# Patient Record
Sex: FEMALE | Race: OTHER | HISPANIC OR LATINO | URBAN - METROPOLITAN AREA
[De-identification: names, ages, dates, MRNs, and addresses within clinical notes are randomized per-mention and may not be internally consistent; named-entity substitution may affect disease eponyms.]

---

## 2018-05-02 ENCOUNTER — INPATIENT (INPATIENT)
Facility: HOSPITAL | Age: 63
LOS: 2 days | Discharge: HOME | End: 2018-05-05
Attending: SURGERY | Admitting: SURGERY
Payer: MEDICAID

## 2018-05-02 VITALS
RESPIRATION RATE: 20 BRPM | HEART RATE: 73 BPM | TEMPERATURE: 98 F | DIASTOLIC BLOOD PRESSURE: 75 MMHG | SYSTOLIC BLOOD PRESSURE: 140 MMHG | OXYGEN SATURATION: 100 %

## 2018-05-02 DIAGNOSIS — Z98.890 OTHER SPECIFIED POSTPROCEDURAL STATES: Chronic | ICD-10-CM

## 2018-05-02 DIAGNOSIS — R10.9 UNSPECIFIED ABDOMINAL PAIN: ICD-10-CM

## 2018-05-02 LAB
ALBUMIN SERPL ELPH-MCNC: 4.5 G/DL — SIGNIFICANT CHANGE UP (ref 3.3–5)
ALP SERPL-CCNC: 66 U/L — SIGNIFICANT CHANGE UP (ref 40–120)
ALT FLD-CCNC: 22 U/L — SIGNIFICANT CHANGE UP (ref 4–33)
APPEARANCE UR: CLEAR — SIGNIFICANT CHANGE UP
AST SERPL-CCNC: 21 U/L — SIGNIFICANT CHANGE UP (ref 4–32)
BASOPHILS # BLD AUTO: 0.04 K/UL — SIGNIFICANT CHANGE UP (ref 0–0.2)
BASOPHILS NFR BLD AUTO: 0.6 % — SIGNIFICANT CHANGE UP (ref 0–2)
BILIRUB SERPL-MCNC: 0.2 MG/DL — SIGNIFICANT CHANGE UP (ref 0.2–1.2)
BILIRUB UR-MCNC: NEGATIVE — SIGNIFICANT CHANGE UP
BLD GP AB SCN SERPL QL: NEGATIVE — SIGNIFICANT CHANGE UP
BLOOD UR QL VISUAL: NEGATIVE — SIGNIFICANT CHANGE UP
BUN SERPL-MCNC: 18 MG/DL — SIGNIFICANT CHANGE UP (ref 7–23)
CALCIUM SERPL-MCNC: 9 MG/DL — SIGNIFICANT CHANGE UP (ref 8.4–10.5)
CHLORIDE SERPL-SCNC: 103 MMOL/L — SIGNIFICANT CHANGE UP (ref 98–107)
CO2 SERPL-SCNC: 26 MMOL/L — SIGNIFICANT CHANGE UP (ref 22–31)
COLOR SPEC: YELLOW — SIGNIFICANT CHANGE UP
CREAT SERPL-MCNC: 0.88 MG/DL — SIGNIFICANT CHANGE UP (ref 0.5–1.3)
EOSINOPHIL # BLD AUTO: 0.23 K/UL — SIGNIFICANT CHANGE UP (ref 0–0.5)
EOSINOPHIL NFR BLD AUTO: 3.6 % — SIGNIFICANT CHANGE UP (ref 0–6)
GLUCOSE SERPL-MCNC: 93 MG/DL — SIGNIFICANT CHANGE UP (ref 70–99)
GLUCOSE UR-MCNC: NEGATIVE — SIGNIFICANT CHANGE UP
HCT VFR BLD CALC: 44.2 % — SIGNIFICANT CHANGE UP (ref 34.5–45)
HGB BLD-MCNC: 14.2 G/DL — SIGNIFICANT CHANGE UP (ref 11.5–15.5)
IMM GRANULOCYTES # BLD AUTO: 0.01 # — SIGNIFICANT CHANGE UP
IMM GRANULOCYTES NFR BLD AUTO: 0.2 % — SIGNIFICANT CHANGE UP (ref 0–1.5)
INR BLD: 1.02 — SIGNIFICANT CHANGE UP (ref 0.88–1.17)
KETONES UR-MCNC: NEGATIVE — SIGNIFICANT CHANGE UP
LEUKOCYTE ESTERASE UR-ACNC: NEGATIVE — SIGNIFICANT CHANGE UP
LIDOCAIN IGE QN: 35.1 U/L — SIGNIFICANT CHANGE UP (ref 7–60)
LYMPHOCYTES # BLD AUTO: 2.87 K/UL — SIGNIFICANT CHANGE UP (ref 1–3.3)
LYMPHOCYTES # BLD AUTO: 45 % — HIGH (ref 13–44)
MCHC RBC-ENTMCNC: 28.8 PG — SIGNIFICANT CHANGE UP (ref 27–34)
MCHC RBC-ENTMCNC: 32.1 % — SIGNIFICANT CHANGE UP (ref 32–36)
MCV RBC AUTO: 89.7 FL — SIGNIFICANT CHANGE UP (ref 80–100)
MONOCYTES # BLD AUTO: 0.48 K/UL — SIGNIFICANT CHANGE UP (ref 0–0.9)
MONOCYTES NFR BLD AUTO: 7.5 % — SIGNIFICANT CHANGE UP (ref 2–14)
MUCOUS THREADS # UR AUTO: SIGNIFICANT CHANGE UP
NEUTROPHILS # BLD AUTO: 2.75 K/UL — SIGNIFICANT CHANGE UP (ref 1.8–7.4)
NEUTROPHILS NFR BLD AUTO: 43.1 % — SIGNIFICANT CHANGE UP (ref 43–77)
NITRITE UR-MCNC: NEGATIVE — SIGNIFICANT CHANGE UP
NON-SQ EPI CELLS # UR AUTO: <1 — SIGNIFICANT CHANGE UP
NRBC # FLD: 0 — SIGNIFICANT CHANGE UP
PH UR: 6 — SIGNIFICANT CHANGE UP (ref 4.6–8)
PLATELET # BLD AUTO: 268 K/UL — SIGNIFICANT CHANGE UP (ref 150–400)
PMV BLD: 9.6 FL — SIGNIFICANT CHANGE UP (ref 7–13)
POTASSIUM SERPL-MCNC: 4.2 MMOL/L — SIGNIFICANT CHANGE UP (ref 3.5–5.3)
POTASSIUM SERPL-SCNC: 4.2 MMOL/L — SIGNIFICANT CHANGE UP (ref 3.5–5.3)
PROT SERPL-MCNC: 7.6 G/DL — SIGNIFICANT CHANGE UP (ref 6–8.3)
PROT UR-MCNC: 10 MG/DL — SIGNIFICANT CHANGE UP
PROTHROM AB SERPL-ACNC: 11.3 SEC — SIGNIFICANT CHANGE UP (ref 9.8–13.1)
RBC # BLD: 4.93 M/UL — SIGNIFICANT CHANGE UP (ref 3.8–5.2)
RBC # FLD: 13.2 % — SIGNIFICANT CHANGE UP (ref 10.3–14.5)
RBC CASTS # UR COMP ASSIST: SIGNIFICANT CHANGE UP (ref 0–?)
RH IG SCN BLD-IMP: POSITIVE — SIGNIFICANT CHANGE UP
RH IG SCN BLD-IMP: POSITIVE — SIGNIFICANT CHANGE UP
SODIUM SERPL-SCNC: 140 MMOL/L — SIGNIFICANT CHANGE UP (ref 135–145)
SP GR SPEC: 1.02 — SIGNIFICANT CHANGE UP (ref 1–1.04)
SQUAMOUS # UR AUTO: SIGNIFICANT CHANGE UP
UROBILINOGEN FLD QL: NORMAL MG/DL — SIGNIFICANT CHANGE UP
WBC # BLD: 6.38 K/UL — SIGNIFICANT CHANGE UP (ref 3.8–10.5)
WBC # FLD AUTO: 6.38 K/UL — SIGNIFICANT CHANGE UP (ref 3.8–10.5)
WBC UR QL: SIGNIFICANT CHANGE UP (ref 0–?)

## 2018-05-02 PROCEDURE — 76705 ECHO EXAM OF ABDOMEN: CPT | Mod: 26

## 2018-05-02 PROCEDURE — 99222 1ST HOSP IP/OBS MODERATE 55: CPT | Mod: 57

## 2018-05-02 RX ORDER — ENOXAPARIN SODIUM 100 MG/ML
40 INJECTION SUBCUTANEOUS DAILY
Qty: 0 | Refills: 0 | Status: DISCONTINUED | OUTPATIENT
Start: 2018-05-02 | End: 2018-05-05

## 2018-05-02 RX ORDER — MORPHINE SULFATE 50 MG/1
4 CAPSULE, EXTENDED RELEASE ORAL ONCE
Qty: 0 | Refills: 0 | Status: DISCONTINUED | OUTPATIENT
Start: 2018-05-02 | End: 2018-05-02

## 2018-05-02 RX ORDER — KETOROLAC TROMETHAMINE 30 MG/ML
30 SYRINGE (ML) INJECTION ONCE
Qty: 0 | Refills: 0 | Status: DISCONTINUED | OUTPATIENT
Start: 2018-05-02 | End: 2018-05-02

## 2018-05-02 RX ORDER — METOCLOPRAMIDE HCL 10 MG
10 TABLET ORAL ONCE
Qty: 0 | Refills: 0 | Status: COMPLETED | OUTPATIENT
Start: 2018-05-02 | End: 2018-05-02

## 2018-05-02 RX ORDER — MORPHINE SULFATE 50 MG/1
2 CAPSULE, EXTENDED RELEASE ORAL EVERY 4 HOURS
Qty: 0 | Refills: 0 | Status: DISCONTINUED | OUTPATIENT
Start: 2018-05-02 | End: 2018-05-03

## 2018-05-02 RX ORDER — CEFOTETAN DISODIUM 1 G
2 VIAL (EA) INJECTION EVERY 12 HOURS
Qty: 0 | Refills: 0 | Status: DISCONTINUED | OUTPATIENT
Start: 2018-05-02 | End: 2018-05-04

## 2018-05-02 RX ORDER — SODIUM CHLORIDE 9 MG/ML
1000 INJECTION, SOLUTION INTRAVENOUS
Qty: 0 | Refills: 0 | Status: DISCONTINUED | OUTPATIENT
Start: 2018-05-02 | End: 2018-05-03

## 2018-05-02 RX ORDER — CEFOTETAN DISODIUM 1 G
1 VIAL (EA) INJECTION ONCE
Qty: 0 | Refills: 0 | Status: COMPLETED | OUTPATIENT
Start: 2018-05-02 | End: 2018-05-02

## 2018-05-02 RX ORDER — SODIUM CHLORIDE 9 MG/ML
1000 INJECTION INTRAMUSCULAR; INTRAVENOUS; SUBCUTANEOUS ONCE
Qty: 0 | Refills: 0 | Status: COMPLETED | OUTPATIENT
Start: 2018-05-02 | End: 2018-05-02

## 2018-05-02 RX ADMIN — Medication 30 MILLIGRAM(S): at 17:54

## 2018-05-02 RX ADMIN — MORPHINE SULFATE 4 MILLIGRAM(S): 50 CAPSULE, EXTENDED RELEASE ORAL at 19:30

## 2018-05-02 RX ADMIN — Medication 100 GRAM(S): at 19:33

## 2018-05-02 RX ADMIN — Medication 10 MILLIGRAM(S): at 21:49

## 2018-05-02 RX ADMIN — MORPHINE SULFATE 4 MILLIGRAM(S): 50 CAPSULE, EXTENDED RELEASE ORAL at 21:05

## 2018-05-02 RX ADMIN — SODIUM CHLORIDE 100 MILLILITER(S): 9 INJECTION, SOLUTION INTRAVENOUS at 23:47

## 2018-05-02 RX ADMIN — Medication 30 MILLIGRAM(S): at 19:33

## 2018-05-02 RX ADMIN — SODIUM CHLORIDE 1000 MILLILITER(S): 9 INJECTION INTRAMUSCULAR; INTRAVENOUS; SUBCUTANEOUS at 17:54

## 2018-05-02 NOTE — H&P ADULT - NSHPPHYSICALEXAM_GEN_ALL_CORE
Tmax: 36.7 (05-02-18 @ 22:14)  HR: 65  BP: 115/64  RR: 18  SpO2: 100%    Gen: NAD  Lungs: Non-labored breathing  Heart: S1, S2  Abdomen: Soft, ND, RUQ TTP.  Back: No tenderness  Extremities: No deformities

## 2018-05-02 NOTE — H&P ADULT - NSHPLABSRESULTS_GEN_ALL_CORE
05-02-18    WBC: 6.38  Hgb: 14.2  Hct: 44.2  Plt: 268    Na: 140  K: 4.2  Cl: 103  HCO3: 26  BUN: 18  Cr: 0.88  Glu: 93    Ca: 9.0    Protein: 7.6  Albumin: 4.5  Total bilirubin: 0.2  Alk phos: 66  AST: 21  ALT: 22  Lipase: 35.1    Ultrasound: 1.3 x 1.5 cm stone in the gallbladder. No gallbladder wall thickening or pericholecystic edema. Gallbladder is normally distended. Negative sonographic Frankel sign. (Patient though received pain medication prior to ultrasound.)

## 2018-05-02 NOTE — ED PROVIDER NOTE - CARE PLAN
Principal Discharge DX:	Abdominal pain Principal Discharge DX:	Abdominal pain  Secondary Diagnosis:	Cholecystitis

## 2018-05-02 NOTE — ED ADULT NURSE NOTE - OBJECTIVE STATEMENT
Fac RN: Pt rcvd in intake, aox3, amb, c/o R flank pain x 2 days, no dysuria or hematuria, denies chest pain, sob, fever, chills. MD gutierrez done, 20G placed on R AC, labs sent, med given as ordered, NAD.

## 2018-05-02 NOTE — ED PROVIDER NOTE - OBJECTIVE STATEMENT
61 y/o female pmh kidney stones c/o R flank/R mid back pain x2 days. pt admits to developing pain suddenly after work x2 days ago. Pt states that she sat down on the couch and then could not get up without assistance. Pt admits to having constant pain since then, worse with palpation and certain movements, no relief with anything. pt states that this does not feel similar to her previous stones. Denies chest pain, sob, n/v/d, dysuria, hematuria, numbness, tingling, weakness, dizziness, syncope, fever or chills.

## 2018-05-02 NOTE — ED PROVIDER NOTE - MEDICAL DECISION MAKING DETAILS
63 y/o female w/ R flank pain x2 days- labs, UA, US, pain control Miguel att: 63 y/o female w/ RUQ x2 days. Pt says she felt it today after drinking coffee.   -pain control, labs, UA, bedside sono c/w acute cholecystitis, surgical consult, antibiotics and admission

## 2018-05-02 NOTE — H&P ADULT - HISTORY OF PRESENT ILLNESS
62 F presents with back pain. For the last 3 days, patient has had back pain radiating to the RUQ of her abdomen. The pain was stabbing, fluctuating in intensity, never fully abating, and was "10/10" at its worst. Patient has had a decreased appetite and has had difficulty performing daily activities due to the intensity of her pain.    Patient attests to normal urinary and bowel habits. Denies fever or chills, nausea, vomiting, constipation, dysuria, change in pain after eating, eating of any fatty food, or previous occurrence of pain for this long of a duration. Of note, she was initially diagnosed with gallstones in 2017, but held off on surgery at that time.

## 2018-05-02 NOTE — ED PROCEDURE NOTE - PROCEDURE ADDITIONAL DETAILS
01986, Ultrasound, Abdomen, limited    Focused ED ultrasound to evaluate for cholelithiasis or cholecystitis:    Indication:   Findings:  Gallstone measuring 1.24 cm visualized at the neck of the gallbladder.  Gallbladder wall wnl measuring 0.25 cm.  Gallbladder of normal size.  No pericholecystic fluid.  Positive sonographic Frankel's sign.   Visualized CBD wnl measuring 0.24 cm.    Impression: Acute cholecystitis.     Procedure note and images placed in chart

## 2018-05-02 NOTE — H&P ADULT - ASSESSMENT
62 F presents with RUQ abdominal pain for 3 days. Found to have a WBC of 6, with a hepatic function panel within normal limits. Ultrasound showed a 1.3 cm stone at the neck. Despite there is no wall thickening or pericholecystic fluid on ultrasound, it is still likely patient has cholecystitis given the patient is tender in the RUQ, with a stone seen in the neck. Will admit to Team B Surgery (Ade Owens) and plan for surgery.  -Pain control  -NPO  -IVF  -VTE prophylaxis  -Continue antibiotics  -Booked and consented for laparoscopic cholecystectomy  -D/w attending  KYLE Mcguire  [id number]

## 2018-05-02 NOTE — ED ADULT TRIAGE NOTE - CHIEF COMPLAINT QUOTE
lower back pain non radiating describes as sharp and "electric:. denies fall or trauma. hx of kidney stones and has + CVA tenderness on R side

## 2018-05-03 ENCOUNTER — RESULT REVIEW (OUTPATIENT)
Age: 63
End: 2018-05-03

## 2018-05-03 ENCOUNTER — TRANSCRIPTION ENCOUNTER (OUTPATIENT)
Age: 63
End: 2018-05-03

## 2018-05-03 LAB
ALBUMIN SERPL ELPH-MCNC: 3.4 G/DL — SIGNIFICANT CHANGE UP (ref 3.3–5)
ALP SERPL-CCNC: 48 U/L — SIGNIFICANT CHANGE UP (ref 40–120)
ALT FLD-CCNC: 19 U/L — SIGNIFICANT CHANGE UP (ref 4–33)
APTT BLD: 32 SEC — SIGNIFICANT CHANGE UP (ref 27.5–37.4)
AST SERPL-CCNC: 17 U/L — SIGNIFICANT CHANGE UP (ref 4–32)
BILIRUB DIRECT SERPL-MCNC: 0.1 MG/DL — SIGNIFICANT CHANGE UP (ref 0.1–0.2)
BILIRUB SERPL-MCNC: 0.4 MG/DL — SIGNIFICANT CHANGE UP (ref 0.2–1.2)
BLD GP AB SCN SERPL QL: NEGATIVE — SIGNIFICANT CHANGE UP
BUN SERPL-MCNC: 14 MG/DL — SIGNIFICANT CHANGE UP (ref 7–23)
CALCIUM SERPL-MCNC: 8.1 MG/DL — LOW (ref 8.4–10.5)
CHLORIDE SERPL-SCNC: 107 MMOL/L — SIGNIFICANT CHANGE UP (ref 98–107)
CO2 SERPL-SCNC: 23 MMOL/L — SIGNIFICANT CHANGE UP (ref 22–31)
CREAT SERPL-MCNC: 0.85 MG/DL — SIGNIFICANT CHANGE UP (ref 0.5–1.3)
GLUCOSE SERPL-MCNC: 105 MG/DL — HIGH (ref 70–99)
HCT VFR BLD CALC: 36.7 % — SIGNIFICANT CHANGE UP (ref 34.5–45)
HGB BLD-MCNC: 12.1 G/DL — SIGNIFICANT CHANGE UP (ref 11.5–15.5)
INR BLD: 1.01 — SIGNIFICANT CHANGE UP (ref 0.88–1.17)
MCHC RBC-ENTMCNC: 28.8 PG — SIGNIFICANT CHANGE UP (ref 27–34)
MCHC RBC-ENTMCNC: 33 % — SIGNIFICANT CHANGE UP (ref 32–36)
MCV RBC AUTO: 87.4 FL — SIGNIFICANT CHANGE UP (ref 80–100)
NRBC # FLD: 0 — SIGNIFICANT CHANGE UP
PLATELET # BLD AUTO: 223 K/UL — SIGNIFICANT CHANGE UP (ref 150–400)
PMV BLD: 9.3 FL — SIGNIFICANT CHANGE UP (ref 7–13)
POTASSIUM SERPL-MCNC: 3.7 MMOL/L — SIGNIFICANT CHANGE UP (ref 3.5–5.3)
POTASSIUM SERPL-SCNC: 3.7 MMOL/L — SIGNIFICANT CHANGE UP (ref 3.5–5.3)
PROT SERPL-MCNC: 5.9 G/DL — LOW (ref 6–8.3)
PROTHROM AB SERPL-ACNC: 11.6 SEC — SIGNIFICANT CHANGE UP (ref 9.8–13.1)
RBC # BLD: 4.2 M/UL — SIGNIFICANT CHANGE UP (ref 3.8–5.2)
RBC # FLD: 13.1 % — SIGNIFICANT CHANGE UP (ref 10.3–14.5)
RH IG SCN BLD-IMP: POSITIVE — SIGNIFICANT CHANGE UP
SODIUM SERPL-SCNC: 142 MMOL/L — SIGNIFICANT CHANGE UP (ref 135–145)
WBC # BLD: 4.58 K/UL — SIGNIFICANT CHANGE UP (ref 3.8–10.5)
WBC # FLD AUTO: 4.58 K/UL — SIGNIFICANT CHANGE UP (ref 3.8–10.5)

## 2018-05-03 RX ORDER — KETOROLAC TROMETHAMINE 30 MG/ML
30 SYRINGE (ML) INJECTION EVERY 6 HOURS
Qty: 0 | Refills: 0 | Status: DISCONTINUED | OUTPATIENT
Start: 2018-05-03 | End: 2018-05-04

## 2018-05-03 RX ORDER — ACETAMINOPHEN 500 MG
1000 TABLET ORAL ONCE
Qty: 0 | Refills: 0 | Status: COMPLETED | OUTPATIENT
Start: 2018-05-03 | End: 2018-05-03

## 2018-05-03 RX ORDER — POTASSIUM CHLORIDE 20 MEQ
10 PACKET (EA) ORAL
Qty: 0 | Refills: 0 | Status: COMPLETED | OUTPATIENT
Start: 2018-05-03 | End: 2018-05-03

## 2018-05-03 RX ORDER — DEXTROSE MONOHYDRATE, SODIUM CHLORIDE, AND POTASSIUM CHLORIDE 50; .745; 4.5 G/1000ML; G/1000ML; G/1000ML
1000 INJECTION, SOLUTION INTRAVENOUS
Qty: 0 | Refills: 0 | Status: DISCONTINUED | OUTPATIENT
Start: 2018-05-03 | End: 2018-05-04

## 2018-05-03 RX ADMIN — Medication 30 MILLIGRAM(S): at 23:00

## 2018-05-03 RX ADMIN — Medication 400 MILLIGRAM(S): at 19:50

## 2018-05-03 RX ADMIN — Medication 100 MILLIEQUIVALENT(S): at 11:55

## 2018-05-03 RX ADMIN — Medication 100 MILLIEQUIVALENT(S): at 09:11

## 2018-05-03 RX ADMIN — Medication 400 MILLIGRAM(S): at 10:39

## 2018-05-03 RX ADMIN — SODIUM CHLORIDE 100 MILLILITER(S): 9 INJECTION, SOLUTION INTRAVENOUS at 09:11

## 2018-05-03 RX ADMIN — Medication 100 GRAM(S): at 07:04

## 2018-05-03 RX ADMIN — Medication 1000 MILLIGRAM(S): at 20:20

## 2018-05-03 RX ADMIN — Medication 100 GRAM(S): at 19:13

## 2018-05-03 RX ADMIN — Medication 100 MILLIEQUIVALENT(S): at 10:38

## 2018-05-03 RX ADMIN — Medication 30 MILLIGRAM(S): at 22:36

## 2018-05-03 RX ADMIN — ENOXAPARIN SODIUM 40 MILLIGRAM(S): 100 INJECTION SUBCUTANEOUS at 11:55

## 2018-05-03 RX ADMIN — Medication 1000 MILLIGRAM(S): at 11:09

## 2018-05-03 NOTE — PROGRESS NOTE ADULT - ASSESSMENT
62 F presents with RUQ abdominal pain for 3 days. Found to have a WBC of 6, with a hepatic function panel within normal limits. Ultrasound showed a 1.3 cm stone at the neck. Despite there is no wall thickening or pericholecystic fluid on ultrasound, it is still likely patient has cholecystitis given the patient is tender in the RUQ, with a stone seen in the neck. Will admit to Team B Surgery (Ade Owens) and plan for surgery.    - Pain control  - Sips/chips today  -NPO past midnight  - IVF  - VTE prophylaxis  - Continue antibiotics  - Booked and consented for laparoscopic cholecystectomy 5/4/18

## 2018-05-03 NOTE — PROGRESS NOTE ADULT - ATTENDING COMMENTS
I have personally interviewed and examined this patient, reviewed pertinent labs and imaging, and discussed the case with colleagues, residents, and physician assistants on B Team rounds.    Plan  will book for marquise marvin for tomorrow  trend lfts  can have sips tonight npo at midnight

## 2018-05-03 NOTE — PROGRESS NOTE ADULT - SUBJECTIVE AND OBJECTIVE BOX
Surgery Progress Note    S: Patient seen and examined. No acute events overnight. Patient continues to report right-sided back/abdominal pain.    O:  Vital Signs Last 24 Hrs  T(C): 36.2 (03 May 2018 09:36), Max: 36.7 (02 May 2018 22:14)  T(F): 97.1 (03 May 2018 09:36), Max: 98.1 (02 May 2018 22:14)  HR: 56 (03 May 2018 09:36) (56 - 73)  BP: 106/61 (03 May 2018 09:36) (101/52 - 140/75)  BP(mean): --  RR: 17 (03 May 2018 09:36) (17 - 20)  SpO2: 96% (03 May 2018 09:36) (96% - 100%)    I&O's Detail    02 May 2018 07:01  -  03 May 2018 07:00  --------------------------------------------------------  IN:    lactated ringers.: 750 mL  Total IN: 750 mL    OUT:    Voided: 800 mL  Total OUT: 800 mL    Total NET: -50 mL      03 May 2018 07:01  -  03 May 2018 11:34  --------------------------------------------------------  IN:    lactated ringers.: 100 mL  Total IN: 100 mL    OUT:    Voided: 420 mL  Total OUT: 420 mL    Total NET: -320 mL          MEDICATIONS  (STANDING):  cefoTEtan  IVPB 2 Gram(s) IV Intermittent every 12 hours  enoxaparin Injectable 40 milliGRAM(s) SubCutaneous daily  lactated ringers. 1000 milliLiter(s) (100 mL/Hr) IV Continuous <Continuous>  potassium chloride  10 mEq/100 mL IVPB 10 milliEquivalent(s) IV Intermittent every 1 hour    MEDICATIONS  (PRN):  morphine  - Injectable 2 milliGRAM(s) IV Push every 4 hours PRN Pain (1-10)                            12.1   4.58  )-----------( 223      ( 03 May 2018 05:20 )             36.7       05-03    142  |  107  |  14  ----------------------------<  105<H>  3.7   |  23  |  0.85    Ca    8.1<L>      03 May 2018 05:20    TPro  5.9<L>  /  Alb  3.4  /  TBili  0.4  /  DBili  0.1  /  AST  17  /  ALT  19  /  AlkPhos  48  05-03      Physical Exam:  Gen: Laying in bed, NAD  Resp: Unlabored breathing  Abd: soft, tender over the RUQ, non-distended. No rebound or guarding.  Ext: WWP

## 2018-05-04 ENCOUNTER — TRANSCRIPTION ENCOUNTER (OUTPATIENT)
Age: 63
End: 2018-05-04

## 2018-05-04 LAB
ALBUMIN SERPL ELPH-MCNC: 3.9 G/DL — SIGNIFICANT CHANGE UP (ref 3.3–5)
ALP SERPL-CCNC: 51 U/L — SIGNIFICANT CHANGE UP (ref 40–120)
ALT FLD-CCNC: 21 U/L — SIGNIFICANT CHANGE UP (ref 4–33)
AST SERPL-CCNC: 17 U/L — SIGNIFICANT CHANGE UP (ref 4–32)
BILIRUB DIRECT SERPL-MCNC: 0.1 MG/DL — SIGNIFICANT CHANGE UP (ref 0.1–0.2)
BILIRUB SERPL-MCNC: 0.4 MG/DL — SIGNIFICANT CHANGE UP (ref 0.2–1.2)
BUN SERPL-MCNC: 7 MG/DL — SIGNIFICANT CHANGE UP (ref 7–23)
CALCIUM SERPL-MCNC: 8.6 MG/DL — SIGNIFICANT CHANGE UP (ref 8.4–10.5)
CHLORIDE SERPL-SCNC: 105 MMOL/L — SIGNIFICANT CHANGE UP (ref 98–107)
CO2 SERPL-SCNC: 26 MMOL/L — SIGNIFICANT CHANGE UP (ref 22–31)
CREAT SERPL-MCNC: 0.89 MG/DL — SIGNIFICANT CHANGE UP (ref 0.5–1.3)
GLUCOSE SERPL-MCNC: 113 MG/DL — HIGH (ref 70–99)
HCT VFR BLD CALC: 41.9 % — SIGNIFICANT CHANGE UP (ref 34.5–45)
HGB BLD-MCNC: 13.8 G/DL — SIGNIFICANT CHANGE UP (ref 11.5–15.5)
MAGNESIUM SERPL-MCNC: 2.3 MG/DL — SIGNIFICANT CHANGE UP (ref 1.6–2.6)
MCHC RBC-ENTMCNC: 28.9 PG — SIGNIFICANT CHANGE UP (ref 27–34)
MCHC RBC-ENTMCNC: 32.9 % — SIGNIFICANT CHANGE UP (ref 32–36)
MCV RBC AUTO: 87.7 FL — SIGNIFICANT CHANGE UP (ref 80–100)
NRBC # FLD: 0 — SIGNIFICANT CHANGE UP
PHOSPHATE SERPL-MCNC: 3.3 MG/DL — SIGNIFICANT CHANGE UP (ref 2.5–4.5)
PLATELET # BLD AUTO: 244 K/UL — SIGNIFICANT CHANGE UP (ref 150–400)
PMV BLD: 9.4 FL — SIGNIFICANT CHANGE UP (ref 7–13)
POTASSIUM SERPL-MCNC: 3.8 MMOL/L — SIGNIFICANT CHANGE UP (ref 3.5–5.3)
POTASSIUM SERPL-SCNC: 3.8 MMOL/L — SIGNIFICANT CHANGE UP (ref 3.5–5.3)
PROT SERPL-MCNC: 6.6 G/DL — SIGNIFICANT CHANGE UP (ref 6–8.3)
RBC # BLD: 4.78 M/UL — SIGNIFICANT CHANGE UP (ref 3.8–5.2)
RBC # FLD: 12.8 % — SIGNIFICANT CHANGE UP (ref 10.3–14.5)
SODIUM SERPL-SCNC: 142 MMOL/L — SIGNIFICANT CHANGE UP (ref 135–145)
WBC # BLD: 3.89 K/UL — SIGNIFICANT CHANGE UP (ref 3.8–10.5)
WBC # FLD AUTO: 3.89 K/UL — SIGNIFICANT CHANGE UP (ref 3.8–10.5)

## 2018-05-04 PROCEDURE — 88304 TISSUE EXAM BY PATHOLOGIST: CPT | Mod: 26

## 2018-05-04 PROCEDURE — 47562 LAPAROSCOPIC CHOLECYSTECTOMY: CPT

## 2018-05-04 RX ORDER — HYDROMORPHONE HYDROCHLORIDE 2 MG/ML
0.5 INJECTION INTRAMUSCULAR; INTRAVENOUS; SUBCUTANEOUS
Qty: 0 | Refills: 0 | Status: DISCONTINUED | OUTPATIENT
Start: 2018-05-04 | End: 2018-05-04

## 2018-05-04 RX ORDER — POTASSIUM CHLORIDE 20 MEQ
10 PACKET (EA) ORAL
Qty: 0 | Refills: 0 | Status: COMPLETED | OUTPATIENT
Start: 2018-05-04 | End: 2018-05-04

## 2018-05-04 RX ORDER — OXYCODONE AND ACETAMINOPHEN 5; 325 MG/1; MG/1
1 TABLET ORAL
Qty: 12 | Refills: 0
Start: 2018-05-04 | End: 2018-05-05

## 2018-05-04 RX ORDER — ONDANSETRON 8 MG/1
4 TABLET, FILM COATED ORAL EVERY 6 HOURS
Qty: 0 | Refills: 0 | Status: DISCONTINUED | OUTPATIENT
Start: 2018-05-04 | End: 2018-05-04

## 2018-05-04 RX ORDER — SODIUM CHLORIDE 9 MG/ML
1000 INJECTION, SOLUTION INTRAVENOUS
Qty: 0 | Refills: 0 | Status: DISCONTINUED | OUTPATIENT
Start: 2018-05-04 | End: 2018-05-04

## 2018-05-04 RX ORDER — OXYCODONE AND ACETAMINOPHEN 5; 325 MG/1; MG/1
1 TABLET ORAL EVERY 4 HOURS
Qty: 0 | Refills: 0 | Status: DISCONTINUED | OUTPATIENT
Start: 2018-05-04 | End: 2018-05-05

## 2018-05-04 RX ADMIN — HYDROMORPHONE HYDROCHLORIDE 0.5 MILLIGRAM(S): 2 INJECTION INTRAMUSCULAR; INTRAVENOUS; SUBCUTANEOUS at 15:30

## 2018-05-04 RX ADMIN — Medication 30 MILLIGRAM(S): at 06:56

## 2018-05-04 RX ADMIN — Medication 30 MILLIGRAM(S): at 06:27

## 2018-05-04 RX ADMIN — Medication 100 MILLIEQUIVALENT(S): at 10:17

## 2018-05-04 RX ADMIN — OXYCODONE AND ACETAMINOPHEN 1 TABLET(S): 5; 325 TABLET ORAL at 22:47

## 2018-05-04 RX ADMIN — OXYCODONE AND ACETAMINOPHEN 1 TABLET(S): 5; 325 TABLET ORAL at 17:39

## 2018-05-04 RX ADMIN — DEXTROSE MONOHYDRATE, SODIUM CHLORIDE, AND POTASSIUM CHLORIDE 100 MILLILITER(S): 50; .745; 4.5 INJECTION, SOLUTION INTRAVENOUS at 06:27

## 2018-05-04 RX ADMIN — Medication 100 GRAM(S): at 06:27

## 2018-05-04 RX ADMIN — ONDANSETRON 4 MILLIGRAM(S): 8 TABLET, FILM COATED ORAL at 16:10

## 2018-05-04 RX ADMIN — OXYCODONE AND ACETAMINOPHEN 1 TABLET(S): 5; 325 TABLET ORAL at 18:15

## 2018-05-04 RX ADMIN — OXYCODONE AND ACETAMINOPHEN 1 TABLET(S): 5; 325 TABLET ORAL at 22:17

## 2018-05-04 RX ADMIN — HYDROMORPHONE HYDROCHLORIDE 0.5 MILLIGRAM(S): 2 INJECTION INTRAMUSCULAR; INTRAVENOUS; SUBCUTANEOUS at 15:15

## 2018-05-04 NOTE — DISCHARGE NOTE ADULT - CONDITIONS AT DISCHARGE
patient alert and orientedx4, ambulating self and well, tolerating to diet, voids qs , 4 scope sites are dry and intact, no complaining of pain or discomfort noted, vital signs are normal. discharge instruction given as ordered.

## 2018-05-04 NOTE — DISCHARGE NOTE ADULT - HOSPITAL COURSE
62 F presents with back pain. For the last 3 days, patient has had back pain radiating to the RUQ of her abdomen. The pain was stabbing, fluctuating in intensity, never fully abating, and was "10/10" at its worst. Patient has had a decreased appetite and has had difficulty performing daily activities due to the intensity of her pain.    Patient attests to normal urinary and bowel habits. Denies fever or chills, nausea, vomiting, constipation, dysuria, change in pain after eating, eating of any fatty food, or previous occurrence of pain for this long of a duration. Of note, she was initially diagnosed with gallstones in 2017, but held off on surgery at that time.    US of the gallbladder revealed cholelithiasis    Patient taken to the OR 5/4 for laparoscopic cholecystectomy. Patient tolerated procedure well. Post op vital signs remained stable. During hospital course patients diet was slowly advanced as tolerated.  At this time, pt is tolerating a regular diet, ambulating and voiding.  Pt is stable for discharge as per the attending at this time.

## 2018-05-04 NOTE — DISCHARGE NOTE ADULT - CARE PLAN
Principal Discharge DX:	Abdominal pain  Goal:	You had surgery, Pain control  Assessment and plan of treatment:	WOUND CARE:  Keep incisions clean, dry, and in tact.   BATHING: Please do not submerge wound underwater. You may shower and/or sponge bathe.  ACTIVITY: No heavy lifting or straining. Otherwise, you may return to your usual level of physical activity. If you are taking narcotic pain medication (such as Percocet) DO NOT drive a car, operate machinery or make important decisions.  DIET: Return to your usual diet.  NOTIFY YOUR SURGEON IF: You have any bleeding that does not stop, any pus draining from your wound(s), any fever (over 100.4 F) or chills, persistent nausea/vomiting, persistent diarrhea, or if your pain is not controlled on your discharge pain medications.  FOLLOW-UP: Please follow up with your primary care physician in one week regarding your hospitalization   Please follow up with Dr. Ruffin within one week of discharge, call office for appointment

## 2018-05-04 NOTE — DISCHARGE NOTE ADULT - MEDICATION SUMMARY - MEDICATIONS TO TAKE
I will START or STAY ON the medications listed below when I get home from the hospital:    oxyCODONE-acetaminophen 5 mg-325 mg oral tablet  -- 1 tab(s) by mouth every 4 -6 hours, As needed, Moderate to Severe Pain MDD:5 tablets  -- Indication: For moderate to severe pain as needed

## 2018-05-04 NOTE — DISCHARGE NOTE ADULT - INSTRUCTIONS
Return to usual diet as tolerated follow up doctors as ordered, notify MD if you any issues, or unusual symptoms, any oozing from the site.

## 2018-05-04 NOTE — PROGRESS NOTE ADULT - ASSESSMENT
62 F presents with RUQ abdominal pain for 3 days. Taken to OR today for lap yon.     - Pain control  - Regular diet  - Off abx  - IVF  - VTE prophylaxis  onsented for laparoscopic cholecystectomy 5/4/18

## 2018-05-04 NOTE — DISCHARGE NOTE ADULT - PATIENT PORTAL LINK FT
You can access the [facility] Patient Portal, offered by , by registering with the following website: [email or web address]

## 2018-05-04 NOTE — BRIEF OPERATIVE NOTE - PROCEDURE
<<-----Click on this checkbox to enter Procedure Laparoscopic cholecystectomy  05/04/2018    Active  [id number]

## 2018-05-04 NOTE — PROGRESS NOTE ADULT - SUBJECTIVE AND OBJECTIVE BOX
B Team Surgery Progress Note    SUBJECTIVE: Pt seen and examined at bedside during morning rounds. Patient to go to the OR today.       Vital Signs Last 24 Hrs  T(C): 36.4 (04 May 2018 14:45), Max: 36.9 (03 May 2018 20:55)  T(F): 97.5 (04 May 2018 14:45), Max: 98.5 (03 May 2018 20:55)  HR: 69 (04 May 2018 15:45) (59 - 81)  BP: 126/74 (04 May 2018 15:45) (101/49 - 133/71)  BP(mean): --  RR: 17 (04 May 2018 15:45) (13 - 19)  SpO2: 96% (04 May 2018 15:45) (96% - 100%)    Physical Exam:  General Appearance: Appears well, NAD  Respiratory: No labored breathing  CV: Pulse regularly present  Abdomen: Soft, nontense, TTP in the RUQ      LABS:                        13.8   3.89  )-----------( 244      ( 04 May 2018 06:16 )             41.9     05-04    142  |  105  |  7   ----------------------------<  113<H>  3.8   |  26  |  0.89    Ca    8.6      04 May 2018 06:16  Phos  3.3     05-04  Mg     2.3     05-04    TPro  6.6  /  Alb  3.9  /  TBili  0.4  /  DBili  0.1  /  AST  17  /  ALT  21  /  AlkPhos  51  05-04    PT/INR - ( 03 May 2018 05:20 )   PT: 11.6 SEC;   INR: 1.01          PTT - ( 03 May 2018 05:20 )  PTT:32.0 SEC  Urinalysis Basic - ( 02 May 2018 17:48 )    Color: YELLOW / Appearance: CLEAR / S.023 / pH: 6.0  Gluc: NEGATIVE / Ketone: NEGATIVE  / Bili: NEGATIVE / Urobili: NORMAL mg/dL   Blood: NEGATIVE / Protein: 10 mg/dL / Nitrite: NEGATIVE   Leuk Esterase: NEGATIVE / RBC: 0-2 / WBC 0-2   Sq Epi: OCC / Non Sq Epi: x / Bacteria: x        INs and OUTs:    18 @ 07:01  -  05-04-18 @ 07:00  --------------------------------------------------------  IN: 2200 mL / OUT: 2870 mL / NET: -670 mL

## 2018-05-04 NOTE — DISCHARGE NOTE ADULT - PLAN OF CARE
You had surgery, Pain control WOUND CARE:  Keep incisions clean, dry, and in tact.   BATHING: Please do not submerge wound underwater. You may shower and/or sponge bathe.  ACTIVITY: No heavy lifting or straining. Otherwise, you may return to your usual level of physical activity. If you are taking narcotic pain medication (such as Percocet) DO NOT drive a car, operate machinery or make important decisions.  DIET: Return to your usual diet.  NOTIFY YOUR SURGEON IF: You have any bleeding that does not stop, any pus draining from your wound(s), any fever (over 100.4 F) or chills, persistent nausea/vomiting, persistent diarrhea, or if your pain is not controlled on your discharge pain medications.  FOLLOW-UP: Please follow up with your primary care physician in one week regarding your hospitalization   Please follow up with Dr. Ruffin within one week of discharge, call office for appointment

## 2018-05-05 VITALS
OXYGEN SATURATION: 98 % | HEART RATE: 77 BPM | TEMPERATURE: 98 F | DIASTOLIC BLOOD PRESSURE: 69 MMHG | SYSTOLIC BLOOD PRESSURE: 110 MMHG | RESPIRATION RATE: 16 BRPM

## 2018-05-05 RX ORDER — IBUPROFEN 200 MG
600 TABLET ORAL ONCE
Qty: 0 | Refills: 0 | Status: COMPLETED | OUTPATIENT
Start: 2018-05-05 | End: 2018-05-05

## 2018-05-05 RX ADMIN — Medication 600 MILLIGRAM(S): at 07:02

## 2018-05-05 RX ADMIN — Medication 600 MILLIGRAM(S): at 06:33

## 2018-05-18 ENCOUNTER — EMERGENCY (EMERGENCY)
Facility: HOSPITAL | Age: 63
LOS: 1 days | Discharge: HOME | End: 2018-05-18
Attending: EMERGENCY MEDICINE | Admitting: EMERGENCY MEDICINE
Payer: MEDICAID

## 2018-05-18 VITALS
RESPIRATION RATE: 18 BRPM | DIASTOLIC BLOOD PRESSURE: 75 MMHG | OXYGEN SATURATION: 100 % | TEMPERATURE: 98 F | HEART RATE: 69 BPM | SYSTOLIC BLOOD PRESSURE: 115 MMHG

## 2018-05-18 VITALS
TEMPERATURE: 98 F | RESPIRATION RATE: 16 BRPM | DIASTOLIC BLOOD PRESSURE: 81 MMHG | OXYGEN SATURATION: 100 % | HEART RATE: 71 BPM | SYSTOLIC BLOOD PRESSURE: 129 MMHG

## 2018-05-18 DIAGNOSIS — Z98.890 OTHER SPECIFIED POSTPROCEDURAL STATES: Chronic | ICD-10-CM

## 2018-05-18 DIAGNOSIS — Z90.49 ACQUIRED ABSENCE OF OTHER SPECIFIED PARTS OF DIGESTIVE TRACT: Chronic | ICD-10-CM

## 2018-05-18 PROBLEM — Z00.00 ENCOUNTER FOR PREVENTIVE HEALTH EXAMINATION: Status: ACTIVE | Noted: 2018-05-18

## 2018-05-18 LAB
ALBUMIN SERPL ELPH-MCNC: 4.1 G/DL — SIGNIFICANT CHANGE UP (ref 3.3–5)
ALP SERPL-CCNC: 59 U/L — SIGNIFICANT CHANGE UP (ref 40–120)
ALT FLD-CCNC: 21 U/L — SIGNIFICANT CHANGE UP (ref 4–33)
AST SERPL-CCNC: 17 U/L — SIGNIFICANT CHANGE UP (ref 4–32)
BASOPHILS # BLD AUTO: 0.03 K/UL — SIGNIFICANT CHANGE UP (ref 0–0.2)
BASOPHILS NFR BLD AUTO: 0.5 % — SIGNIFICANT CHANGE UP (ref 0–2)
BILIRUB SERPL-MCNC: 0.4 MG/DL — SIGNIFICANT CHANGE UP (ref 0.2–1.2)
BUN SERPL-MCNC: 11 MG/DL — SIGNIFICANT CHANGE UP (ref 7–23)
CALCIUM SERPL-MCNC: 9.2 MG/DL — SIGNIFICANT CHANGE UP (ref 8.4–10.5)
CHLORIDE SERPL-SCNC: 104 MMOL/L — SIGNIFICANT CHANGE UP (ref 98–107)
CO2 SERPL-SCNC: 25 MMOL/L — SIGNIFICANT CHANGE UP (ref 22–31)
CREAT SERPL-MCNC: 0.76 MG/DL — SIGNIFICANT CHANGE UP (ref 0.5–1.3)
EOSINOPHIL # BLD AUTO: 0.22 K/UL — SIGNIFICANT CHANGE UP (ref 0–0.5)
EOSINOPHIL NFR BLD AUTO: 4 % — SIGNIFICANT CHANGE UP (ref 0–6)
GLUCOSE SERPL-MCNC: 99 MG/DL — SIGNIFICANT CHANGE UP (ref 70–99)
HCT VFR BLD CALC: 38.5 % — SIGNIFICANT CHANGE UP (ref 34.5–45)
HGB BLD-MCNC: 13.2 G/DL — SIGNIFICANT CHANGE UP (ref 11.5–15.5)
IMM GRANULOCYTES # BLD AUTO: 0.01 # — SIGNIFICANT CHANGE UP
IMM GRANULOCYTES NFR BLD AUTO: 0.2 % — SIGNIFICANT CHANGE UP (ref 0–1.5)
LIDOCAIN IGE QN: 26.7 U/L — SIGNIFICANT CHANGE UP (ref 7–60)
LYMPHOCYTES # BLD AUTO: 2.18 K/UL — SIGNIFICANT CHANGE UP (ref 1–3.3)
LYMPHOCYTES # BLD AUTO: 39.3 % — SIGNIFICANT CHANGE UP (ref 13–44)
MCHC RBC-ENTMCNC: 29.9 PG — SIGNIFICANT CHANGE UP (ref 27–34)
MCHC RBC-ENTMCNC: 34.3 % — SIGNIFICANT CHANGE UP (ref 32–36)
MCV RBC AUTO: 87.3 FL — SIGNIFICANT CHANGE UP (ref 80–100)
MONOCYTES # BLD AUTO: 0.32 K/UL — SIGNIFICANT CHANGE UP (ref 0–0.9)
MONOCYTES NFR BLD AUTO: 5.8 % — SIGNIFICANT CHANGE UP (ref 2–14)
NEUTROPHILS # BLD AUTO: 2.79 K/UL — SIGNIFICANT CHANGE UP (ref 1.8–7.4)
NEUTROPHILS NFR BLD AUTO: 50.2 % — SIGNIFICANT CHANGE UP (ref 43–77)
NRBC # FLD: 0 — SIGNIFICANT CHANGE UP
PLATELET # BLD AUTO: 275 K/UL — SIGNIFICANT CHANGE UP (ref 150–400)
PMV BLD: 9.6 FL — SIGNIFICANT CHANGE UP (ref 7–13)
POTASSIUM SERPL-MCNC: 3.8 MMOL/L — SIGNIFICANT CHANGE UP (ref 3.5–5.3)
POTASSIUM SERPL-SCNC: 3.8 MMOL/L — SIGNIFICANT CHANGE UP (ref 3.5–5.3)
PROT SERPL-MCNC: 7.2 G/DL — SIGNIFICANT CHANGE UP (ref 6–8.3)
RBC # BLD: 4.41 M/UL — SIGNIFICANT CHANGE UP (ref 3.8–5.2)
RBC # FLD: 13.4 % — SIGNIFICANT CHANGE UP (ref 10.3–14.5)
SODIUM SERPL-SCNC: 141 MMOL/L — SIGNIFICANT CHANGE UP (ref 135–145)
WBC # BLD: 5.55 K/UL — SIGNIFICANT CHANGE UP (ref 3.8–10.5)
WBC # FLD AUTO: 5.55 K/UL — SIGNIFICANT CHANGE UP (ref 3.8–10.5)

## 2018-05-18 PROCEDURE — 74177 CT ABD & PELVIS W/CONTRAST: CPT | Mod: 26

## 2018-05-18 PROCEDURE — 99284 EMERGENCY DEPT VISIT MOD MDM: CPT

## 2018-05-18 RX ORDER — ACETAMINOPHEN 500 MG
1000 TABLET ORAL ONCE
Qty: 0 | Refills: 0 | Status: COMPLETED | OUTPATIENT
Start: 2018-05-18 | End: 2018-05-18

## 2018-05-18 RX ORDER — SODIUM CHLORIDE 9 MG/ML
1000 INJECTION INTRAMUSCULAR; INTRAVENOUS; SUBCUTANEOUS ONCE
Qty: 0 | Refills: 0 | Status: COMPLETED | OUTPATIENT
Start: 2018-05-18 | End: 2018-05-18

## 2018-05-18 RX ORDER — CEPHALEXIN 500 MG
1 CAPSULE ORAL
Qty: 20 | Refills: 0
Start: 2018-05-18 | End: 2018-05-22

## 2018-05-18 RX ORDER — CEPHALEXIN 500 MG
500 CAPSULE ORAL ONCE
Qty: 0 | Refills: 0 | Status: COMPLETED | OUTPATIENT
Start: 2018-05-18 | End: 2018-05-18

## 2018-05-18 RX ADMIN — Medication 400 MILLIGRAM(S): at 15:27

## 2018-05-18 RX ADMIN — Medication 500 MILLIGRAM(S): at 19:55

## 2018-05-18 RX ADMIN — SODIUM CHLORIDE 1000 MILLILITER(S): 9 INJECTION INTRAMUSCULAR; INTRAVENOUS; SUBCUTANEOUS at 15:27

## 2018-05-18 NOTE — ED ADULT TRIAGE NOTE - CHIEF COMPLAINT QUOTE
Pt 2 weeks s/p cholecystectomy, c/o chills/dizziness/nausea/diarrhea, c/o purulent drainage/redness/pain to 2 out of 4 sites.

## 2018-05-18 NOTE — ED PROVIDER NOTE - MEDICAL DECISION MAKING DETAILS
61 y/o female with abd discomfort, nausea, weakness, chills, and some discharge from incision sites s/p laparoscopic cholecystectomy 2 wks ago. Likely some local skin irritation, r/o abscess collection. Labs, CT, Analgesia.

## 2018-05-18 NOTE — ED PROVIDER NOTE - PROGRESS NOTE DETAILS
MD CHO:  I received s/o on this pt from Dr. Coronado.  Pending surg recs.  Discussed with Jona, surg res, ok for dc with short course of abx for possible early cellulitis, surg f/u as previously scheduled.

## 2018-05-18 NOTE — ED PROVIDER NOTE - CARE PLAN
Principal Discharge DX:	Post-op pain  Assessment and plan of treatment:	Take the prescribed antibiotics as directed for their entire course.  Be sure to follow up with your surgeon at your scheduled appointment.  RETURN TO THE EMERGENCY DEPARTMENT IMMEDIATELY FOR SEVERE PAIN, IF YOU CANNOT EAT OR DRINK, FEVER, OR FOR ANY OTHER CONCERN.

## 2018-05-18 NOTE — ED PROVIDER NOTE - PSH
H/O left knee surgery    History of breast surgery    History of hand surgery    S/P cholecystectomy

## 2018-05-18 NOTE — ED PROVIDER NOTE - SKIN WOUND DESCRIPTION
mild erythema around incision sites, no drainage or discharge. Pt has had on steristrips since discharge

## 2018-05-18 NOTE — ED PROVIDER NOTE - OBJECTIVE STATEMENT
63 y/o female with recent cholecystectomy 2 weeks ago presents to ED for chills, abd pain, and discharge from laparoscopic incision sites. Pt states since discharge she has had some increased weakness with nausea and chills. Pt notes over the past day having some discharge from one of the incision sites. Pt also notes some increased redness around the area. Pt denies any fever, vomiting, SOB, or chest pain. 61 y/o female with recent cholecystectomy 2 weeks ago presents to ED for chills, abd pain, and discharge from laparoscopic incision sites. Pt states since discharge she has had some increased weakness with nausea and chills. Pt notes over the past day having some discharge from one of the incision sites. Pt also notes some increased redness around the area. Pt denies any fever, vomiting, SOB, or chest pain. Surgery done by Dr. Ruffin.

## 2018-05-19 LAB
SPECIMEN SOURCE: SIGNIFICANT CHANGE UP
SPECIMEN SOURCE: SIGNIFICANT CHANGE UP

## 2018-05-23 LAB
BACTERIA BLD CULT: SIGNIFICANT CHANGE UP
BACTERIA BLD CULT: SIGNIFICANT CHANGE UP

## 2018-05-24 ENCOUNTER — APPOINTMENT (OUTPATIENT)
Dept: TRAUMA SURGERY | Facility: CLINIC | Age: 63
End: 2018-05-24
Payer: MEDICAID

## 2018-05-24 VITALS
TEMPERATURE: 98.6 F | SYSTOLIC BLOOD PRESSURE: 118 MMHG | HEART RATE: 68 BPM | HEIGHT: 62 IN | WEIGHT: 210 LBS | BODY MASS INDEX: 38.64 KG/M2 | DIASTOLIC BLOOD PRESSURE: 84 MMHG

## 2018-05-24 DIAGNOSIS — G89.29 DORSALGIA, UNSPECIFIED: ICD-10-CM

## 2018-05-24 DIAGNOSIS — R13.10 DYSPHAGIA, UNSPECIFIED: ICD-10-CM

## 2018-05-24 DIAGNOSIS — M54.9 DORSALGIA, UNSPECIFIED: ICD-10-CM

## 2018-05-24 DIAGNOSIS — L02.91 CUTANEOUS ABSCESS, UNSPECIFIED: ICD-10-CM

## 2018-05-24 PROCEDURE — 99024 POSTOP FOLLOW-UP VISIT: CPT

## 2018-05-24 RX ORDER — PANTOPRAZOLE 40 MG/1
40 TABLET, DELAYED RELEASE ORAL DAILY
Qty: 30 | Refills: 1 | Status: ACTIVE | COMMUNITY
Start: 2018-05-24 | End: 1900-01-01

## 2018-07-13 ENCOUNTER — EMERGENCY (EMERGENCY)
Facility: HOSPITAL | Age: 63
LOS: 1 days | Discharge: HOME | End: 2018-07-13
Attending: EMERGENCY MEDICINE | Admitting: EMERGENCY MEDICINE
Payer: MEDICAID

## 2018-07-13 VITALS
DIASTOLIC BLOOD PRESSURE: 66 MMHG | HEART RATE: 65 BPM | TEMPERATURE: 98 F | RESPIRATION RATE: 16 BRPM | SYSTOLIC BLOOD PRESSURE: 126 MMHG | OXYGEN SATURATION: 100 %

## 2018-07-13 VITALS
HEART RATE: 76 BPM | TEMPERATURE: 99 F | SYSTOLIC BLOOD PRESSURE: 137 MMHG | RESPIRATION RATE: 16 BRPM | OXYGEN SATURATION: 100 % | DIASTOLIC BLOOD PRESSURE: 98 MMHG

## 2018-07-13 DIAGNOSIS — Z90.49 ACQUIRED ABSENCE OF OTHER SPECIFIED PARTS OF DIGESTIVE TRACT: Chronic | ICD-10-CM

## 2018-07-13 DIAGNOSIS — Z98.890 OTHER SPECIFIED POSTPROCEDURAL STATES: Chronic | ICD-10-CM

## 2018-07-13 LAB
ALBUMIN SERPL ELPH-MCNC: 4.3 G/DL — SIGNIFICANT CHANGE UP (ref 3.3–5)
ALP SERPL-CCNC: 63 U/L — SIGNIFICANT CHANGE UP (ref 40–120)
ALT FLD-CCNC: 16 U/L — SIGNIFICANT CHANGE UP (ref 4–33)
APPEARANCE UR: CLEAR — SIGNIFICANT CHANGE UP
AST SERPL-CCNC: 17 U/L — SIGNIFICANT CHANGE UP (ref 4–32)
BACTERIA # UR AUTO: SIGNIFICANT CHANGE UP
BASE EXCESS BLDV CALC-SCNC: 1.4 MMOL/L — SIGNIFICANT CHANGE UP
BASOPHILS # BLD AUTO: 0.03 K/UL — SIGNIFICANT CHANGE UP (ref 0–0.2)
BASOPHILS NFR BLD AUTO: 0.3 % — SIGNIFICANT CHANGE UP (ref 0–2)
BILIRUB SERPL-MCNC: 0.4 MG/DL — SIGNIFICANT CHANGE UP (ref 0.2–1.2)
BILIRUB UR-MCNC: NEGATIVE — SIGNIFICANT CHANGE UP
BLOOD GAS VENOUS - CREATININE: 1.08 MG/DL — SIGNIFICANT CHANGE UP (ref 0.5–1.3)
BLOOD UR QL VISUAL: HIGH
BUN SERPL-MCNC: 20 MG/DL — SIGNIFICANT CHANGE UP (ref 7–23)
CALCIUM SERPL-MCNC: 9.9 MG/DL — SIGNIFICANT CHANGE UP (ref 8.4–10.5)
CHLORIDE BLDV-SCNC: 111 MMOL/L — HIGH (ref 96–108)
CHLORIDE SERPL-SCNC: 104 MMOL/L — SIGNIFICANT CHANGE UP (ref 98–107)
CO2 SERPL-SCNC: 24 MMOL/L — SIGNIFICANT CHANGE UP (ref 22–31)
COLOR SPEC: YELLOW — SIGNIFICANT CHANGE UP
CREAT SERPL-MCNC: 1.04 MG/DL — SIGNIFICANT CHANGE UP (ref 0.5–1.3)
EOSINOPHIL # BLD AUTO: 0.12 K/UL — SIGNIFICANT CHANGE UP (ref 0–0.5)
EOSINOPHIL NFR BLD AUTO: 1.2 % — SIGNIFICANT CHANGE UP (ref 0–6)
GAS PNL BLDV: 139 MMOL/L — SIGNIFICANT CHANGE UP (ref 136–146)
GLUCOSE BLDV-MCNC: 128 — HIGH (ref 70–99)
GLUCOSE SERPL-MCNC: 129 MG/DL — HIGH (ref 70–99)
GLUCOSE UR-MCNC: NEGATIVE — SIGNIFICANT CHANGE UP
HCO3 BLDV-SCNC: 25 MMOL/L — SIGNIFICANT CHANGE UP (ref 20–27)
HCT VFR BLD CALC: 40.9 % — SIGNIFICANT CHANGE UP (ref 34.5–45)
HCT VFR BLDV CALC: 42.9 % — SIGNIFICANT CHANGE UP (ref 34.5–45)
HGB BLD-MCNC: 13.7 G/DL — SIGNIFICANT CHANGE UP (ref 11.5–15.5)
HGB BLDV-MCNC: 14 G/DL — SIGNIFICANT CHANGE UP (ref 11.5–15.5)
IMM GRANULOCYTES # BLD AUTO: 0.03 # — SIGNIFICANT CHANGE UP
IMM GRANULOCYTES NFR BLD AUTO: 0.3 % — SIGNIFICANT CHANGE UP (ref 0–1.5)
KETONES UR-MCNC: NEGATIVE — SIGNIFICANT CHANGE UP
LACTATE BLDV-MCNC: 2.2 MMOL/L — HIGH (ref 0.5–2)
LEUKOCYTE ESTERASE UR-ACNC: NEGATIVE — SIGNIFICANT CHANGE UP
LIDOCAIN IGE QN: 28.5 U/L — SIGNIFICANT CHANGE UP (ref 7–60)
LYMPHOCYTES # BLD AUTO: 1.72 K/UL — SIGNIFICANT CHANGE UP (ref 1–3.3)
LYMPHOCYTES # BLD AUTO: 17 % — SIGNIFICANT CHANGE UP (ref 13–44)
MCHC RBC-ENTMCNC: 29.7 PG — SIGNIFICANT CHANGE UP (ref 27–34)
MCHC RBC-ENTMCNC: 33.5 % — SIGNIFICANT CHANGE UP (ref 32–36)
MCV RBC AUTO: 88.7 FL — SIGNIFICANT CHANGE UP (ref 80–100)
MONOCYTES # BLD AUTO: 0.67 K/UL — SIGNIFICANT CHANGE UP (ref 0–0.9)
MONOCYTES NFR BLD AUTO: 6.6 % — SIGNIFICANT CHANGE UP (ref 2–14)
MUCOUS THREADS # UR AUTO: SIGNIFICANT CHANGE UP
NEUTROPHILS # BLD AUTO: 7.57 K/UL — HIGH (ref 1.8–7.4)
NEUTROPHILS NFR BLD AUTO: 74.6 % — SIGNIFICANT CHANGE UP (ref 43–77)
NITRITE UR-MCNC: NEGATIVE — SIGNIFICANT CHANGE UP
NRBC # FLD: 0 — SIGNIFICANT CHANGE UP
PCO2 BLDV: 43 MMHG — SIGNIFICANT CHANGE UP (ref 41–51)
PH BLDV: 7.39 PH — SIGNIFICANT CHANGE UP (ref 7.32–7.43)
PH UR: 6 — SIGNIFICANT CHANGE UP (ref 4.6–8)
PLATELET # BLD AUTO: 268 K/UL — SIGNIFICANT CHANGE UP (ref 150–400)
PMV BLD: 9.7 FL — SIGNIFICANT CHANGE UP (ref 7–13)
PO2 BLDV: 32 MMHG — LOW (ref 35–40)
POTASSIUM BLDV-SCNC: 3.9 MMOL/L — SIGNIFICANT CHANGE UP (ref 3.4–4.5)
POTASSIUM SERPL-MCNC: 4.1 MMOL/L — SIGNIFICANT CHANGE UP (ref 3.5–5.3)
POTASSIUM SERPL-SCNC: 4.1 MMOL/L — SIGNIFICANT CHANGE UP (ref 3.5–5.3)
PROT SERPL-MCNC: 7.1 G/DL — SIGNIFICANT CHANGE UP (ref 6–8.3)
PROT UR-MCNC: 30 MG/DL — HIGH
RBC # BLD: 4.61 M/UL — SIGNIFICANT CHANGE UP (ref 3.8–5.2)
RBC # FLD: 13.2 % — SIGNIFICANT CHANGE UP (ref 10.3–14.5)
RBC CASTS # UR COMP ASSIST: SIGNIFICANT CHANGE UP (ref 0–?)
SAO2 % BLDV: 56.9 % — LOW (ref 60–85)
SODIUM SERPL-SCNC: 141 MMOL/L — SIGNIFICANT CHANGE UP (ref 135–145)
SP GR SPEC: 1.02 — SIGNIFICANT CHANGE UP (ref 1–1.04)
UROBILINOGEN FLD QL: NORMAL MG/DL — SIGNIFICANT CHANGE UP
WBC # BLD: 10.14 K/UL — SIGNIFICANT CHANGE UP (ref 3.8–10.5)
WBC # FLD AUTO: 10.14 K/UL — SIGNIFICANT CHANGE UP (ref 3.8–10.5)
WBC UR QL: SIGNIFICANT CHANGE UP (ref 0–?)

## 2018-07-13 PROCEDURE — 74176 CT ABD & PELVIS W/O CONTRAST: CPT | Mod: 26

## 2018-07-13 PROCEDURE — 99285 EMERGENCY DEPT VISIT HI MDM: CPT | Mod: 25

## 2018-07-13 RX ORDER — IBUPROFEN 200 MG
1 TABLET ORAL
Qty: 20 | Refills: 0
Start: 2018-07-13 | End: 2018-07-17

## 2018-07-13 RX ORDER — OXYCODONE HYDROCHLORIDE 5 MG/1
1 TABLET ORAL
Qty: 20 | Refills: 0
Start: 2018-07-13

## 2018-07-13 RX ORDER — SODIUM CHLORIDE 9 MG/ML
1000 INJECTION INTRAMUSCULAR; INTRAVENOUS; SUBCUTANEOUS ONCE
Qty: 0 | Refills: 0 | Status: COMPLETED | OUTPATIENT
Start: 2018-07-13 | End: 2018-07-13

## 2018-07-13 RX ORDER — ONDANSETRON 8 MG/1
4 TABLET, FILM COATED ORAL ONCE
Qty: 0 | Refills: 0 | Status: COMPLETED | OUTPATIENT
Start: 2018-07-13 | End: 2018-07-13

## 2018-07-13 RX ORDER — FAMOTIDINE 10 MG/ML
20 INJECTION INTRAVENOUS ONCE
Qty: 0 | Refills: 0 | Status: COMPLETED | OUTPATIENT
Start: 2018-07-13 | End: 2018-07-13

## 2018-07-13 RX ORDER — TAMSULOSIN HYDROCHLORIDE 0.4 MG/1
1 CAPSULE ORAL
Qty: 7 | Refills: 0
Start: 2018-07-13 | End: 2018-07-19

## 2018-07-13 RX ORDER — ONDANSETRON 8 MG/1
1 TABLET, FILM COATED ORAL
Qty: 9 | Refills: 0
Start: 2018-07-13 | End: 2018-07-15

## 2018-07-13 RX ORDER — FENTANYL CITRATE 50 UG/ML
50 INJECTION INTRAVENOUS ONCE
Qty: 0 | Refills: 0 | Status: DISCONTINUED | OUTPATIENT
Start: 2018-07-13 | End: 2018-07-13

## 2018-07-13 RX ADMIN — FENTANYL CITRATE 50 MICROGRAM(S): 50 INJECTION INTRAVENOUS at 02:08

## 2018-07-13 RX ADMIN — SODIUM CHLORIDE 1000 MILLILITER(S): 9 INJECTION INTRAMUSCULAR; INTRAVENOUS; SUBCUTANEOUS at 02:08

## 2018-07-13 RX ADMIN — FAMOTIDINE 20 MILLIGRAM(S): 10 INJECTION INTRAVENOUS at 02:08

## 2018-07-13 RX ADMIN — ONDANSETRON 4 MILLIGRAM(S): 8 TABLET, FILM COATED ORAL at 02:08

## 2018-07-13 NOTE — ED PROVIDER NOTE - OBJECTIVE STATEMENT
61 yo female with PMH of laparoscopic cholecystectomy in May complicated by a post-op infection about one week post-surgery, h/o kidney stones in pole of right kidney never have been symptomatic, presents to the ED with right flank pain radiating to b/l groin. Patient states pain started about 6pm tonight. Notes decreased appetite throughout the date. Patient states she has chronic right lower back pain but the groin pain is new today. States she tried to urinate earlier but felt like only a little bit of urine came out. + chills but denies objective fever, denies CP, palpitations, cough, dysuria, hematuria, cloudy urine, foul smelling urine.

## 2018-07-13 NOTE — ED PROVIDER NOTE - PROGRESS NOTE DETAILS
RUCKER: Pt pain improved with meds, no longer nausea, tolerating PO. CT shows Right kidney stone at [facility]J with mild hydro. UA neg for infection but + for blood only. Will send home with all labs and ct results to f/u with PMD and Uro outpt. Rx for motrin and oxy for breakthrough pain and flomax. Return for worsening symptoms.

## 2018-07-13 NOTE — ED ADULT TRIAGE NOTE - CHIEF COMPLAINT QUOTE
pt c/o sharp lower abdominal pain w/radiation to R. flank, urinary retention and vomiting since 6pm, PMH of "stationary kidney stones", denies fever/chills, hematuria- pt tearful in triage, appears uncomfortable.

## 2018-07-13 NOTE — ED ADULT NURSE NOTE - OBJECTIVE STATEMENT
Floanni RN: Patient endorses right flank pain x 1 year more severe today accompanied with vomiting after eating at 1800 last night and urgency. Patient appears uncomfortable. Denies weakness, dizziness, endorsing some chill. Afebrile in ED. Plan is for IV, labs, meds, CT scan, ua, reassess. Report Endorsed to Primary RN. Hx cholecystectomy, kidney stones

## 2018-10-02 ENCOUNTER — APPOINTMENT (OUTPATIENT)
Dept: INTERNAL MEDICINE | Facility: CLINIC | Age: 63
End: 2018-10-02

## 2019-02-10 ENCOUNTER — EMERGENCY (EMERGENCY)
Facility: HOSPITAL | Age: 64
LOS: 1 days | Discharge: HOME | End: 2019-02-10
Admitting: EMERGENCY MEDICINE
Payer: MEDICAID

## 2019-02-10 VITALS
DIASTOLIC BLOOD PRESSURE: 76 MMHG | OXYGEN SATURATION: 98 % | TEMPERATURE: 98 F | HEART RATE: 75 BPM | RESPIRATION RATE: 18 BRPM | SYSTOLIC BLOOD PRESSURE: 135 MMHG

## 2019-02-10 DIAGNOSIS — Z98.890 OTHER SPECIFIED POSTPROCEDURAL STATES: Chronic | ICD-10-CM

## 2019-02-10 DIAGNOSIS — Z90.49 ACQUIRED ABSENCE OF OTHER SPECIFIED PARTS OF DIGESTIVE TRACT: Chronic | ICD-10-CM

## 2019-02-10 PROCEDURE — 99283 EMERGENCY DEPT VISIT LOW MDM: CPT

## 2019-02-10 PROCEDURE — 71046 X-RAY EXAM CHEST 2 VIEWS: CPT | Mod: 26

## 2019-02-10 RX ADMIN — Medication 100 MILLIGRAM(S): at 18:26

## 2019-02-10 NOTE — ED PROVIDER NOTE - OBJECTIVE STATEMENT
64 y/o female no pmh c/o cough x2 weeks. Pt admits to non productive cough and myalgias x2 weeks ago. pt states that myalgias have improved but cough has been persistent. pt denies taking any OTC meds. pt admits to sick  at home with similar symptoms. Pt denies chest pain, sob, abd pain, n/v/d, numbness, tingling, weakness, dizziness, syncope, fever or chills.

## 2019-02-10 NOTE — ED ADULT TRIAGE NOTE - CHIEF COMPLAINT QUOTE
pt amb to triage co flu-like symptoms x 3 weeks, dry cough noted on presentation, denies OTC meds prior to arrival, cough noted on presentation, mask placed in triage, denies getting flu shot this season

## 2019-11-08 NOTE — H&P ADULT - NSHPROSALLOTHERNEGRD_GEN_ALL_CORE
[facility]  Hospitalist Admission Note  Name: Brea Kerr    MRN: [id number]  YOB: 1938    Age: 80 year old  Date of admission: 11/7/2019  Primary care provider: Whitley Nichole            Assessment and Plan:   Brea Kerr is a 80 year old female known prior history of paroxysmal A. 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    1.  Slurring of speech, difficulty putting words, aphasia secondary to acute to subacute CVA  2.  History of paroxysmal atrial fibrillation  3.  Severe uncontrolled hypertension  4.  Dyslipidemia, history of intolerance to statins with severe myopathy  5.  History of RA on methotrexate  6.  Previous complicated recurrent diverticulitis, status post Ritchie sigmoidectomy with colostomy bag  7.  Incidental finding of 3.5 mm x 3.5 mm right ICA aneurysm and 2 x 2 mm left distal ICA aneurysm  8.  UTI versus asymptomatic bacteriuria    Admit as inpatient.  Continue with cardiac telemetry monitoring.  Remain at risk for clinical deterioration.  Optimize blood pressure control.  Resumption of her home regimen losartan and metoprolol.  May need further adjustment of her BP regimen if blood pressure remains elevated.  Continue with aspirin therapy.  Stroke neurology formal evaluation requested.  Echocardiogram with bubble study  pending  Patient underwent MRI/MRA of brain.  Likely will benefit for chronic anticoagulation given history of paroxysmal A. fib, hypertension and recent CVA.  Will await further recommendations from stroke service  Will not start anticoagulation for now, will wait at least 24 hours from this diagnosis.  Of note patient stated that she tolerated warfarin in the past for a lower extremity DVT.  Check lipid panel however patient mentioned that she was not able to tolerate statins in the past with myopathy on her last statin administration somewhat more than 10 years ago?  Started on antibiotics with Keflex, she denies any obvious urinary symptoms but there were some concerns if earlier mental status might also be related to this suspected UTI.  Will continue antibiotics for now.  Cultures sent earlier.  PT/OT/SLP evaluation    Code status: Full code  Admit to inpatient  Prophylaxis: PCD's, will benefit for chemo DVT prophylaxis if not started on anticoagulation  Disposition: Hopeful for home discharge but likely will be needing at least 2 inpatient hospitalization days.          Chief Complaint:   Slurring of speech, difficulty putting words and her thoughts together at least in the past 2 days duration       Source of Information:   Patient with good reliability  Discussion with ED physician  Review of E chart records         History of Present Illness:   Brea Kerr is a 80 year old female known prior history of paroxysmal A. 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  There was no report of any fevers, chills, chest pain, shortness of breath, coughing spells, nausea, vomiting, abdominal pain, back pain, urinary symptomatology, bleeding tendencies, nasal congestion, neck pain nor fall events.  She denies also of lightheadedness, blurry vision, headache or focal weakness nor numbness or tingling sensation.    Upon presentation the emergency room she was found with severe uncontrolled hypertension and further investigation revealed normal sinus rhythm on her EKG, possible UTI, further imaging revealed CVA with numerous acute/subacute left MCA vascular distribution cortical and subcortical infarcts.  With this findings and recent symptomatology mayi was referred to us for further management care hence this hospitalization.  Her case was also discussed with stroke neurology and was deemed to be not a candidate for reperfusion therapy.   During the time of my exam I found Brea laying comfortably in bed, awake, alert, interactive with spontaneous and coherent speech.  She endorses no other complaints.          Past Medical History:     Past Medical History:   Diagnosis Date     Diverticula, colon      Hypertension              Past Surgical History:     Past Surgical History:   Procedure Laterality Date     LAPAROSCOPIC ASSISTED COLOSTOMY N/A 10/2/2018    Procedure: LAPAROSCOPIC ASSISTED COLOSTOMY;;  Surgeon: Emma Reddy MD;  Location: [facility] OR     LAPAROSCOPIC ASSISTED SIGMOID COLECTOMY N/A 10/2/2018    Procedure: LAPAROSCOPIC ASSISTED SIGMOID COLECTOMY;  Exploratory laparoscopy, open sigmoid colectomy with end colostomy, extensive lysis of adhesions, and takedown of colovesical fistula with drain removal and drain placement;  Surgeon: Emma Reddy MD;  Location: [facility] OR     SPLENECTOMY                Social History:     Social History     Tobacco Use     Smoking status: Never Smoker     Smokeless tobacco: Never Used   Substance Use Topics     Alcohol use: Not Currently     Frequency: Never             Family History:   Family history was fully reviewed and non-contributory in this case.         Allergies:     Allergies   Allergen Reactions     Hmg-Coa-R Inhibitors      PN: LW Reaction: muscle pain     Lisinopril      PN: LW Reaction: Cough     Sulfa Drugs      Nausea               Medications:     Prior to Admission medications    Medication Sig Last Dose Taking? Auth Provider   acetaminophen (TYLENOL) 500 MG tablet Take 500-1,000 mg by mouth every 6 hours as needed for mild pain  Yes Reported, Patient   albuterol (PROAIR HFA/PROVENTIL HFA/VENTOLIN HFA) 108 (90 Base) MCG/ACT inhaler Inhale 2 puffs into the lungs every 6 hours as needed for shortness of breath / dyspnea or wheezing  Yes Unknown, Entered By History   aspirin 81 MG EC tablet Take 81 mg by mouth At Bedtime  11/7/2019 at Unknown time Yes Unknown, Entered By History   DiphenhydrAMINE HCl (BENADRYL PO) Take 50 mg by mouth daily as needed  Yes Reported, Patient   folic acid (FOLVITE) 1 MG tablet Take 1 mg by mouth daily 11/7/2019 at Unknown time Yes Unknown, Entered By History   ibuprofen (ADVIL/MOTRIN) 600 MG tablet TK 1 T PO Q 6 H PRN P  Yes Whitley Nichole MD   losartan (COZAAR) 100 MG tablet Take 1 tablet (100 mg) by mouth daily 11/7/2019 at am Yes Whitley Nichole MD   METHOTREXATE PO Take 5 mg by mouth once a week  Past Week at e Yes Reported, Patient   metoprolol tartrate (LOPRESSOR) 25 MG tablet TAKE 1 TABLET(25 MG) BY MOUTH TWICE DAILY 11/7/2019 at am Yes Whitley Nichole MD   Multiple Vitamins-Minerals (CENTRUM SILVER) per tablet Take 1 tablet by mouth daily 11/7/2019 at am Yes Reported, Patient   Vitamin D, Cholecalciferol, 1000 units CAPS Take 1,000 Units by mouth daily 11/7/2019 at am Yes Unknown, Entered By  All other review of systems negative, except as noted in HPI "History   order for DME Equipment being ordered: Walker Wheels () and Walker ()  Treatment Diagnosis: impaired gait   Nash Paris MD             Review of Systems:   A Comprehensive greater than 10 system review of systems was carried out.  Pertinent positives and negatives are noted above.  Otherwise negative for contributory information.           Physical Exam:   Blood pressure (!) 165/71, pulse 59, temperature 97.4  F (36.3  C), temperature source Axillary, resp. rate 12, height 1.549 m (5' 1\"), weight 57 kg (125 lb 10.6 oz), SpO2 96 %, not currently breastfeeding.  Wt Readings from Last 1 Encounters:   11/07/19 57 kg (125 lb 10.6 oz)     Exam:  GENERAL: No apparent distress. Awake, alert, and fully oriented.  HEENT: Normocephalic, atraumatic. Extraocular movements intact.  CARDIOVASCULAR: Regular rate and rhythm without murmurs or rubs. No JVD  PULMONARY: Clear to auscultation, no wheezes, crackles  ABDOMINAL: Soft, non-tender, non-distended. Bowel sounds normoactive. No hepatosplenomegaly.  EXTREMITIES: No cyanosis or clubbing. No edema.  NEUROLOGICAL: CN 2-12 grossly intact, awake and alert x3, spontaneous and coherent speech. no focal neurological deficits.  No dysmetria, can do serial sevens, can identify colors, object  DERMATOLOGICAL: No rash, ulcer, ecchymoses, jaundice.  Psych: not agitation, not combative, pleasant mood  Lymph nodes: no obvious palpable  cervical or axillary lymphadenopathy         Data:   EKG:    Normal sinus rhythm at 75 bpm  Imaging:  Recent Results (from the past 48 hour(s))   MR Brain w/o & w Contrast    Narrative    EXAM: MR BRAIN W/O AND W CONTRAST  LOCATION: [facility]  DATE/TIME: 11/7/2019 4:33 PM    INDICATION: Aphasia.  COMPARISON: MRA [place] from today.  CONTRAST: 10 mL Gadavist.  TECHNIQUE: Routine multiplanar multisequence head MRI without and with intravenous contrast.    FINDINGS:  INTRACRANIAL CONTENTS: There are numerous " acute/subacute infarcts involving the left MCA vascular territory. There is evidence of highly restricted diffusion along the cortex of the left frontal operculum. There is additional cortically restricted   diffusion involving the cortex of the left inferior parietal lobe. There is a focus of cortically restricted diffusion involving the left insula. There is associated FLAIR signal abnormality indicating that these findings are older than 4-6 hours. No   other evidence of highly restricted diffusion. There is no evidence of hemorrhagic transformation or significant mass effect from the above-described left MCA infarcts. No mass, acute hemorrhage, or extra-axial fluid collections. Otherwise there is a   mild to moderate degree of patchy and confluent periventricular and deep white matter T2 FLAIR hyperintensity, likely related to chronic small vessel vascular change. Mild age-appropriate generalized brain volume loss. Old appearing bilateral cerebellar   infarcts. Normal ventricles and sulci for age. Normal position of the cerebellar tonsils. No pathologic contrast enhancement.    SELLA: No abnormality accounting for technique.    OSSEOUS STRUCTURES/SOFT TISSUES: Normal marrow signal. The left vertebral artery appears dominant. There is a diminished flow void in the distal right vertebral.     ORBITS: Bilateral pseudophakia.     SINUSES/MASTOIDS: No paranasal sinus mucosal disease. No middle ear or mastoid effusion.       Impression    IMPRESSION:  1.  Numerous acute/subacute left MCA vascular distribution cortical and subcortical infarcts as detailed above, without evidence of hemorrhagic transformation or significant mass effect.    2.  Otherwise there is age-appropriate generalized brain volume loss and a mild to moderate degree of nonspecific T2 FLAIR hyperintensity, likely related to chronic small vessel vascular change.       MRA Brain ([place]) wo Contrast    Narrative    EXAM: MRA BRAIN ([place]) WO CONTRAST  LOCATION: [facility]  DATE/TIME: 11/7/2019 4:34 PM    INDICATION: Aphasia  COMPARISON: Brain MR from today. MRA neck from today.  TECHNIQUE: 3D time-of-flight head MRA without intravenous contrast.    FINDINGS:  ANTERIOR CIRCULATION: There is no evidence of significant stenosis. Note made of a 3.5 x 3.5 mm right supraclinoid ICA aneurysm projecting superiorly and laterally, see axial image 89 of the axial time-of-flight sequence. Additionally, there is a 2.0 x   2.0 mm left paraophthalmic distal ICA aneurysm, see axial image 87 of the axial time-of-flight series. There is a mild, less than 50% stenosis within the M1 segment of the left middle cerebral artery. Standard [place] anatomy. There is fetal   origin of the left posterior cerebral artery, a normal variant.    POSTERIOR CIRCULATION: No stenosis/occlusion, aneurysm, or high flow vascular malformation. Balanced vertebral arteries supply a normal basilar artery.       Impression    IMPRESSION:  1.  No evidence of significant intracranial stenosis. Note made of a mild stenosis, less than 50%, in the M1 segment of left middle cerebral artery.    2.  There are bilateral supraclinoid ICA aneurysms as detailed above.       MRA Neck (Carotids) wo & w Contrast    Narrative    EXAM: MRA NECK (CAROTIDS) WO and W CONTRAST  LOCATION: [facility]  DATE/TIME: 11/7/2019 4:34 PM    INDICATION: Aphasia  COMPARISON: Brain MR from today. MRA [place] from today.  CONTRAST: 10 mL Gadavist  TECHNIQUE: Neck MRA without and with IV contrast. Stenosis measurements made according to NASCET criteria unless otherwise specified.    FINDINGS:  RIGHT CAROTID: No measurable stenosis or dissection.    LEFT CAROTID: No measurable stenosis or dissection.    VERTEBRAL ARTERIES: No focal stenosis or dissection. The left vertebral artery is dominant. Right vertebral artery is small in caliber throughout, particularly in the V4  segment.    AORTIC ARCH: Classic aortic arch anatomy with no significant stenosis at the origin of the great vessels.      Impression    IMPRESSION:  1.  Normal neck MRA.       Labs:  Recent Labs   Lab 11/07/19  1558   CULT PENDING     Recent Labs   Lab 11/07/19 2106 11/07/19  1606   WBC  --  6.4   HGB  --  14.0   HCT  --  43.0   MCV  --  102*    196     Recent Labs   Lab 11/07/19 2106 11/07/19  1606   NA  --  138   POTASSIUM  --  3.9   CHLORIDE  --  103   CO2  --  29   ANIONGAP  --  6   GLC  --  98   BUN  --  24   CR 0.83 0.90   GFRESTIMATED 66 60*   GFRESTBLACK 76 69   PAULINA  --  9.0     Recent Labs   Lab 11/07/19  1606      POTASSIUM 3.9   CHLORIDE 103   CO2 29   GLC 98     Recent Labs   Lab 11/07/19  1606 11/07/19  1531   GLC 98  --    BGM  --  133*     No results for input(s): INR in the last 168 hours.  No results for input(s): LIPASE in the last 168 hours.  Recent Labs   Lab 11/07/19  1606   TROPI <0.015     Recent Labs   Lab 11/07/19  1558   COLOR Yellow   APPEARANCE Clear   URINEGLC Negative   URINEBILI Negative   URINEKETONE Negative   SG 1.024   UBLD Negative   URINEPH 6.0   PROTEIN 10*   NITRITE Negative   LEUKEST Large*   RBCU 6*   WBCU 45*

## 2020-01-20 NOTE — ED PROVIDER NOTE - PLAN OF CARE
Alert and oriented, no focal deficits, no motor or sensory deficits.
Take the prescribed antibiotics as directed for their entire course.  Be sure to follow up with your surgeon at your scheduled appointment.  RETURN TO THE EMERGENCY DEPARTMENT IMMEDIATELY FOR SEVERE PAIN, IF YOU CANNOT EAT OR DRINK, FEVER, OR FOR ANY OTHER CONCERN.
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
## 2021-08-20 NOTE — ED PROVIDER NOTE - ENMT, MLM
Is This A New Presentation, Or A Follow-Up?: Phototherapy Treatment
Airway patent, Nasal mucosa clear. Mouth with normal mucosa. Throat has no vesicles, no oropharyngeal exudates and uvula is midline.

## 2022-01-01 NOTE — ED PROVIDER NOTE - NSCAREINITIATED _GEN_ER
Interval/Overnight Events:  _________________________________________________________________  Respiratory:    _________________________________________________________________  Cardiac:  Cardiac Rhythm: Sinus rhythm      _________________________________________________________________  Hematologic:      ________________________________________________________________  Infectious:    ampicillin IV Intermittent - Peds 325 milliGRAM(s) IV Intermittent every 6 hours  gentamicin  IV Intermittent - Peds 21.5 milliGRAM(s) IV Intermittent every 36 hours    RECENT CULTURES:  12-03 @ 21:04 .CSF CSF     No growth    polymorphonuclear leukocytes seen  No organisms seen  by cytocentrifuge        ________________________________________________________________  Fluids/Electrolytes/Nutrition:  I&O's Summary    04 Dec 2022 07:01  -  05 Dec 2022 07:00  --------------------------------------------------------  IN: 423 mL / OUT: 518 mL / NET: -95 mL      Diet:    dextrose 5% + sodium chloride 0.9% with potassium chloride 20 mEq/L. - Pediatric 1000 milliLiter(s) IV Continuous <Continuous>    _________________________________________________________________  Neurologic:  Adequacy of sedation and pain control has been assessed and adjusted    acetaminophen   Rectal Suppository - Peds. 80 milliGRAM(s) Rectal every 6 hours PRN    ________________________________________________________________  Additional Meds:    sucrose 24% Oral Liquid - Peds 0.2 milliLiter(s) Oral once PRN    ________________________________________________________________  Access:    Necessity of urinary, arterial, and venous catheters discussed  ________________________________________________________________  Labs:      _________________________________________________________________  Imaging:    _________________________________________________________________  PE:  T(C): 37.6 (12-05-22 @ 05:00), Max: 37.9 (12-04-22 @ 09:45)  HR: 156 (12-05-22 @ 06:41) (149 - 189)  BP: 85/43 (12-05-22 @ 05:00) (76/44 - 100/50)  ABP: --  ABP(mean): --  RR: 69 (12-05-22 @ 05:00) (27 - 72)  SpO2: 98% (12-05-22 @ 06:41) (92% - 100%)  CVP(mm Hg): --    General:	No distress  Respiratory:      Effort even and unlabored. Clear bilaterally.   CV:                   Regular rate and rhythm. Normal S1/S2. No murmurs, rubs, or   .                       gallop. Capillary refill < 2 seconds. Distal pulses 2+ and equal.  Abdomen:	Soft, non-distended. Bowel sounds present.   Skin:		No rashes.  Extremities:	Warm and well perfused.   Neurologic:	Alert.  No acute change from baseline exam.  ________________________________________________________________  Patient and Parent/Guardian was updated as to the progress/plan of care.    The patient remains in critical and unstable condition, and requires ICU care and monitoring. Total critical care time spent by attending physician was minutes, excluding procedure time.    The patient is improving but requires continued monitoring and adjustment of therapy.   Eric Centeno(Attending)

## 2022-11-22 ENCOUNTER — INPATIENT (INPATIENT)
Facility: HOSPITAL | Age: 67
LOS: 2 days | Discharge: HOME | End: 2022-11-25
Attending: STUDENT IN AN ORGANIZED HEALTH CARE EDUCATION/TRAINING PROGRAM | Admitting: STUDENT IN AN ORGANIZED HEALTH CARE EDUCATION/TRAINING PROGRAM

## 2022-11-22 VITALS
SYSTOLIC BLOOD PRESSURE: 149 MMHG | DIASTOLIC BLOOD PRESSURE: 71 MMHG | HEART RATE: 70 BPM | RESPIRATION RATE: 18 BRPM | TEMPERATURE: 98 F | OXYGEN SATURATION: 100 %

## 2022-11-22 DIAGNOSIS — Z98.890 OTHER SPECIFIED POSTPROCEDURAL STATES: Chronic | ICD-10-CM

## 2022-11-22 DIAGNOSIS — Z90.49 ACQUIRED ABSENCE OF OTHER SPECIFIED PARTS OF DIGESTIVE TRACT: Chronic | ICD-10-CM

## 2022-11-22 PROCEDURE — 99285 EMERGENCY DEPT VISIT HI MDM: CPT

## 2022-11-22 NOTE — ED PROVIDER NOTE - CLINICAL SUMMARY MEDICAL DECISION MAKING FREE TEXT BOX
67-year-old female with no pertinent past medical history presents for evaluation of rash for 10 days.  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 Pt treated with Valtrex without improvement. C/f disseminated shingles vs allergic rxn to insect bite. Will ***. Dispo. 67-year-old female with no pertinent past medical history presents for evaluation of rash for 10 days.  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 Pt treated with Valtrex without improvement. C/f disseminated shingles vs allergic rxn to insect bite. Will obtain labs and ID consult. Dispo.

## 2022-11-22 NOTE — ED ADULT NURSE NOTE - HIV OFFER
[id number]] Collected: 03/04/21 6038    Kylah Galdamez MD  Authorized by: Kylah Galdamez MD     ECG reviewed by me, the ED Provider: yes    Patient location:  ED  Previous ECG:     Previous ECG:  Unavailable  Interpretation:     Interpretation: abnormal    Rate:     ECG rate assessment: normal    Rhythm:     Rhythm: sinus rhythm    Ectopy:     Ectopy: none    QRS:     QRS axis:  Normal    QRS intervals:  Normal  ST segments:     ST segments:  Abnormal    Depression:  II, III, V5, V6 and aVF  T waves:     T waves: inverted      Inverted:  V5, V6, aVF, II and III  CriticalCare Time  Performed by: Kylah Galdamez MD  Authorized by: Kylah Galdamez [address]      [place]  Follow up  [phone]            Bernarda Bosch MD  03/08/21 8233 Opt out

## 2022-11-22 NOTE — ED PROVIDER NOTE - NS ED ROS FT
ROS:  GENERAL: No fever, no chills  HEENT: no vision changes, no trouble swallowing, no trouble speaking  CARDIAC: no chest pain  PULMONARY: no cough, no shortness of breath  GI: no abdominal pain, no nausea, no vomiting, no diarrhea, no constipation  SKIN: As per HPI   NEURO: no headache, no weakness, no dizziness  MSK: No joint pain  All other systems reviewed as negative.

## 2022-11-22 NOTE — ED PROVIDER NOTE - NS ED ATTENDING STATEMENT MOD
This was a shared visit with the EILEEN. I reviewed and verified the documentation and independently performed the documented:

## 2022-11-22 NOTE — ED PROVIDER NOTE - PHYSICAL EXAMINATION
CONSTITUTIONAL: Well-appearing; well-nourished obese female in no apparent distress. Non-toxic appearing.   NEURO: Alert & oriented. Gait steady without assistance. Sensory and motor functions are grossly intact.  PSYCH: Mood appropriate. Thought processes intact.   NECK: Supple  CARD: Regular rate and rhythm, no murmurs  RESP: No accessory muscle use; breath sounds clear and equal bilaterally; no wheezes, rhonchi, or rales     MUSCULOSKELETAL/EXTREMITIES: FROM in all four extremities; no extremity edema.  SKIN: To the anterior chest wall beneath the clavicle there is scattered erythematous wheal vs induration with tiny pinpoint papules noted with scabbing going down toward the L breast. To the dorsum of the R forearm there are 2 areas of increased erythema with pinpoint papules noted with scabs. similar appearing lesion to posterior R calf. CONSTITUTIONAL: Well-appearing; well-nourished obese female in no apparent distress. Non-toxic appearing.   NEURO: Alert & oriented. Gait steady without assistance. Sensory and motor functions are grossly intact.  PSYCH: Mood appropriate. Thought processes intact.   NECK: Supple  EYES: PERRL, EOMs intact. No lid edema, crusting, proptosis. Sclera white, conjucntiva pink. No obvious FBs noted to the cornea. Fluorescein stain reveals no dendritic lesions.   ENT: EACs are without edema, erythema, or tenderness on speculum exam. TMs pearly b/l, no erythema or bulging. Nose midline, no rhinorrhea.   CARD: Regular rate and rhythm, no murmurs  RESP: No accessory muscle use; breath sounds clear and equal bilaterally; no wheezes, rhonchi, or rales     MUSCULOSKELETAL/EXTREMITIES: FROM in all four extremities; no extremity edema.  SKIN: To the anterior chest wall beneath the clavicle there is scattered erythematous wheal vs induration with tiny pinpoint papules noted with scabbing going down toward the L breast. To the dorsum of the R forearm there are 2 areas of increased erythema with pinpoint papules noted with scabs. similar appearing lesion to posterior R calf.

## 2022-11-22 NOTE — ED PROVIDER NOTE - ATTENDING APP SHARED VISIT CONTRIBUTION OF CARE
I performed a face-to-face evaluation of the patient and performed a history and physical examination along with the resident or ACP, and/or medical student above.  I agree with the history and physical examination as documented by the resident or ACP, and/or medical student above.  Velia:  67-year-old female, denies significant past medical history, presents for evaluation of diffuse body rash.  Patient states that she was recently diagnosed with shingles by her PMD for rash on left upper chest wall,  completed week-long course of Valtrex, but now has developed new lesions on  Face, right upper extremity and right lower extremity,  denies any visual symptoms or hearing loss.  rash is erythematous and vesicular in appearance.   reports rash was preceded by severe pain described as "1000 needles sticking me".  H&P concerning for disseminated zoster.  Will obtain labs, give IV  antivirals, and admit for ID consult.

## 2022-11-22 NOTE — ED PROVIDER NOTE - PROGRESS NOTE DETAILS
KRISSY Hammer: Hospitalist accepts case for admission. KRISSY Hammer: Case d/w ID who recommends admission to the hospital, Acyclovir 10 mg/kg, and will reassess in the AM.

## 2022-11-22 NOTE — ED PROVIDER NOTE - NSICDXPASTSURGICALHX_GEN_ALL_CORE_FT
PAST SURGICAL HISTORY:  H/O left knee surgery     History of breast surgery     History of hand surgery     S/P cholecystectomy

## 2022-11-22 NOTE — ED PROVIDER NOTE - WET READ LAUNCH FT
Pt came in as a direct admit from [facility]. A&O, 2L NC, Vs stable. Pt has refused his Lovenox and is weary to take meds, he states that his PCP focuses on holistic medicine. Education was provided regarding necessity for medication. Will continue to monitor.    No pertinent past medical history There are no Wet Read(s) to document. <<----- Click to add NO pertinent Past Medical History

## 2022-11-22 NOTE — ED PROVIDER NOTE - OBJECTIVE STATEMENT
67-year-old female with no pertinent past medical history presents for evaluation of rash for 10 days.  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  Patient then went to urgent care and was told that it has shingles and was started on Valtrex which she completed but notes she has new lesions on her right forearm as well as her right lower extremity despite taking the medication.  Patient denies fever, chills, cough, chest pain, or shortness of breath.  Of note, patient feels as though there are lesions arising beneath her left eye.  Denies associated visual changes.  Patient notes the rash feels as if there are "thousand needles are sticking her".  No other voiced complaints.

## 2022-11-22 NOTE — ED ADULT TRIAGE NOTE - CHIEF COMPLAINT QUOTE
pt c/o worsening shingles x1 week. Pt states finished valtrex yesterday but symptoms worsening, denies fever

## 2022-11-22 NOTE — ED ADULT NURSE NOTE - OBJECTIVE STATEMENT
Pt arrives to ED intake rm 2 A&Ox4 and ambulatory c/o red rash that is covering the upper part of their body for the past 10 days. pt describes the rash as "1000 needles that are pressing into their body". There is no active drainage coming from the rash. Pt denies itching, burning from the rash. Respirations are even and unlabored. 18G placed to LAC, labs drawn and sent.

## 2022-11-23 DIAGNOSIS — B02.7 DISSEMINATED ZOSTER: ICD-10-CM

## 2022-11-23 DIAGNOSIS — B02.9 ZOSTER WITHOUT COMPLICATIONS: ICD-10-CM

## 2022-11-23 DIAGNOSIS — R32 UNSPECIFIED URINARY INCONTINENCE: ICD-10-CM

## 2022-11-23 DIAGNOSIS — Z29.9 ENCOUNTER FOR PROPHYLACTIC MEASURES, UNSPECIFIED: ICD-10-CM

## 2022-11-23 DIAGNOSIS — L02.429 FURUNCLE OF LIMB, UNSPECIFIED: ICD-10-CM

## 2022-11-23 LAB
ALBUMIN SERPL ELPH-MCNC: 4.1 G/DL — SIGNIFICANT CHANGE UP (ref 3.3–5)
ALP SERPL-CCNC: 59 U/L — SIGNIFICANT CHANGE UP (ref 40–120)
ALT FLD-CCNC: 13 U/L — SIGNIFICANT CHANGE UP (ref 4–33)
ANION GAP SERPL CALC-SCNC: 10 MMOL/L — SIGNIFICANT CHANGE UP (ref 7–14)
AST SERPL-CCNC: 16 U/L — SIGNIFICANT CHANGE UP (ref 4–32)
BASOPHILS # BLD AUTO: 0.04 K/UL — SIGNIFICANT CHANGE UP (ref 0–0.2)
BASOPHILS NFR BLD AUTO: 0.7 % — SIGNIFICANT CHANGE UP (ref 0–2)
BILIRUB SERPL-MCNC: 0.2 MG/DL — SIGNIFICANT CHANGE UP (ref 0.2–1.2)
BUN SERPL-MCNC: 19 MG/DL — SIGNIFICANT CHANGE UP (ref 7–23)
CALCIUM SERPL-MCNC: 8.8 MG/DL — SIGNIFICANT CHANGE UP (ref 8.4–10.5)
CHLORIDE SERPL-SCNC: 106 MMOL/L — SIGNIFICANT CHANGE UP (ref 98–107)
CO2 SERPL-SCNC: 25 MMOL/L — SIGNIFICANT CHANGE UP (ref 22–31)
CREAT SERPL-MCNC: 0.91 MG/DL — SIGNIFICANT CHANGE UP (ref 0.5–1.3)
CRP SERPL-MCNC: <3 MG/L — SIGNIFICANT CHANGE UP
EGFR: 69 ML/MIN/1.73M2 — SIGNIFICANT CHANGE UP
EOSINOPHIL # BLD AUTO: 0.25 K/UL — SIGNIFICANT CHANGE UP (ref 0–0.5)
EOSINOPHIL NFR BLD AUTO: 4.3 % — SIGNIFICANT CHANGE UP (ref 0–6)
ERYTHROCYTE [SEDIMENTATION RATE] IN BLOOD: 10 MM/HR — SIGNIFICANT CHANGE UP (ref 4–25)
FLUAV AG NPH QL: SIGNIFICANT CHANGE UP
FLUBV AG NPH QL: SIGNIFICANT CHANGE UP
GLUCOSE SERPL-MCNC: 107 MG/DL — HIGH (ref 70–99)
HCT VFR BLD CALC: 38.4 % — SIGNIFICANT CHANGE UP (ref 34.5–45)
HGB BLD-MCNC: 12.6 G/DL — SIGNIFICANT CHANGE UP (ref 11.5–15.5)
HIV 1+2 AB+HIV1 P24 AG SERPL QL IA: SIGNIFICANT CHANGE UP
IANC: 2.17 K/UL — SIGNIFICANT CHANGE UP (ref 1.8–7.4)
IMM GRANULOCYTES NFR BLD AUTO: 0.2 % — SIGNIFICANT CHANGE UP (ref 0–0.9)
LYMPHOCYTES # BLD AUTO: 2.77 K/UL — SIGNIFICANT CHANGE UP (ref 1–3.3)
LYMPHOCYTES # BLD AUTO: 48.1 % — HIGH (ref 13–44)
MCHC RBC-ENTMCNC: 29.2 PG — SIGNIFICANT CHANGE UP (ref 27–34)
MCHC RBC-ENTMCNC: 32.8 GM/DL — SIGNIFICANT CHANGE UP (ref 32–36)
MCV RBC AUTO: 89.1 FL — SIGNIFICANT CHANGE UP (ref 80–100)
MONOCYTES # BLD AUTO: 0.52 K/UL — SIGNIFICANT CHANGE UP (ref 0–0.9)
MONOCYTES NFR BLD AUTO: 9 % — SIGNIFICANT CHANGE UP (ref 2–14)
NEUTROPHILS # BLD AUTO: 2.17 K/UL — SIGNIFICANT CHANGE UP (ref 1.8–7.4)
NEUTROPHILS NFR BLD AUTO: 37.7 % — LOW (ref 43–77)
NRBC # BLD: 0 /100 WBCS — SIGNIFICANT CHANGE UP (ref 0–0)
NRBC # FLD: 0 K/UL — SIGNIFICANT CHANGE UP (ref 0–0)
PLATELET # BLD AUTO: 259 K/UL — SIGNIFICANT CHANGE UP (ref 150–400)
POTASSIUM SERPL-MCNC: 3.7 MMOL/L — SIGNIFICANT CHANGE UP (ref 3.5–5.3)
POTASSIUM SERPL-SCNC: 3.7 MMOL/L — SIGNIFICANT CHANGE UP (ref 3.5–5.3)
PROT SERPL-MCNC: 6.8 G/DL — SIGNIFICANT CHANGE UP (ref 6–8.3)
RBC # BLD: 4.31 M/UL — SIGNIFICANT CHANGE UP (ref 3.8–5.2)
RBC # FLD: 13.5 % — SIGNIFICANT CHANGE UP (ref 10.3–14.5)
RSV RNA NPH QL NAA+NON-PROBE: SIGNIFICANT CHANGE UP
SARS-COV-2 RNA SPEC QL NAA+PROBE: SIGNIFICANT CHANGE UP
SODIUM SERPL-SCNC: 141 MMOL/L — SIGNIFICANT CHANGE UP (ref 135–145)
WBC # BLD: 5.76 K/UL — SIGNIFICANT CHANGE UP (ref 3.8–10.5)
WBC # FLD AUTO: 5.76 K/UL — SIGNIFICANT CHANGE UP (ref 3.8–10.5)

## 2022-11-23 PROCEDURE — 12345: CPT | Mod: NC

## 2022-11-23 PROCEDURE — 99222 1ST HOSP IP/OBS MODERATE 55: CPT | Mod: GC

## 2022-11-23 PROCEDURE — 99223 1ST HOSP IP/OBS HIGH 75: CPT

## 2022-11-23 RX ORDER — NALOXONE HYDROCHLORIDE 4 MG/.1ML
0.4 SPRAY NASAL ONCE
Refills: 0 | Status: DISCONTINUED | OUTPATIENT
Start: 2022-11-23 | End: 2022-11-23

## 2022-11-23 RX ORDER — OXYCODONE HYDROCHLORIDE 5 MG/1
2.5 TABLET ORAL EVERY 4 HOURS
Refills: 0 | Status: DISCONTINUED | OUTPATIENT
Start: 2022-11-23 | End: 2022-11-23

## 2022-11-23 RX ORDER — ONDANSETRON 8 MG/1
4 TABLET, FILM COATED ORAL EVERY 8 HOURS
Refills: 0 | Status: DISCONTINUED | OUTPATIENT
Start: 2022-11-23 | End: 2022-11-25

## 2022-11-23 RX ORDER — OXYCODONE HYDROCHLORIDE 5 MG/1
5 TABLET ORAL EVERY 4 HOURS
Refills: 0 | Status: DISCONTINUED | OUTPATIENT
Start: 2022-11-23 | End: 2022-11-23

## 2022-11-23 RX ORDER — LANOLIN ALCOHOL/MO/W.PET/CERES
3 CREAM (GRAM) TOPICAL AT BEDTIME
Refills: 0 | Status: DISCONTINUED | OUTPATIENT
Start: 2022-11-23 | End: 2022-11-25

## 2022-11-23 RX ORDER — POLYETHYLENE GLYCOL 3350 17 G/17G
17 POWDER, FOR SOLUTION ORAL DAILY
Refills: 0 | Status: DISCONTINUED | OUTPATIENT
Start: 2022-11-23 | End: 2022-11-24

## 2022-11-23 RX ORDER — ACETAMINOPHEN 500 MG
650 TABLET ORAL EVERY 6 HOURS
Refills: 0 | Status: DISCONTINUED | OUTPATIENT
Start: 2022-11-23 | End: 2022-11-25

## 2022-11-23 RX ORDER — ACYCLOVIR SODIUM 500 MG
700 VIAL (EA) INTRAVENOUS ONCE
Refills: 0 | Status: COMPLETED | OUTPATIENT
Start: 2022-11-23 | End: 2022-11-23

## 2022-11-23 RX ORDER — SODIUM CHLORIDE 9 MG/ML
1000 INJECTION, SOLUTION INTRAVENOUS ONCE
Refills: 0 | Status: COMPLETED | OUTPATIENT
Start: 2022-11-23 | End: 2022-11-23

## 2022-11-23 RX ORDER — ACYCLOVIR SODIUM 500 MG
500 VIAL (EA) INTRAVENOUS EVERY 8 HOURS
Refills: 0 | Status: DISCONTINUED | OUTPATIENT
Start: 2022-11-23 | End: 2022-11-23

## 2022-11-23 RX ORDER — SENNA PLUS 8.6 MG/1
2 TABLET ORAL AT BEDTIME
Refills: 0 | Status: DISCONTINUED | OUTPATIENT
Start: 2022-11-23 | End: 2022-11-24

## 2022-11-23 RX ORDER — ENOXAPARIN SODIUM 100 MG/ML
40 INJECTION SUBCUTANEOUS EVERY 12 HOURS
Refills: 0 | Status: DISCONTINUED | OUTPATIENT
Start: 2022-11-23 | End: 2022-11-24

## 2022-11-23 RX ORDER — ACYCLOVIR SODIUM 500 MG
650 VIAL (EA) INTRAVENOUS EVERY 8 HOURS
Refills: 0 | Status: DISCONTINUED | OUTPATIENT
Start: 2022-11-23 | End: 2022-11-25

## 2022-11-23 RX ORDER — GABAPENTIN 400 MG/1
100 CAPSULE ORAL THREE TIMES A DAY
Refills: 0 | Status: DISCONTINUED | OUTPATIENT
Start: 2022-11-23 | End: 2022-11-24

## 2022-11-23 RX ORDER — SODIUM CHLORIDE 9 MG/ML
1000 INJECTION, SOLUTION INTRAVENOUS
Refills: 0 | Status: DISCONTINUED | OUTPATIENT
Start: 2022-11-23 | End: 2022-11-25

## 2022-11-23 RX ADMIN — Medication 264 MILLIGRAM(S): at 02:17

## 2022-11-23 RX ADMIN — SODIUM CHLORIDE 100 MILLILITER(S): 9 INJECTION, SOLUTION INTRAVENOUS at 17:43

## 2022-11-23 RX ADMIN — SENNA PLUS 2 TABLET(S): 8.6 TABLET ORAL at 22:04

## 2022-11-23 RX ADMIN — Medication 650 MILLIGRAM(S): at 07:28

## 2022-11-23 RX ADMIN — Medication 650 MILLIGRAM(S): at 21:50

## 2022-11-23 RX ADMIN — Medication 113 MILLIGRAM(S): at 16:25

## 2022-11-23 RX ADMIN — Medication 263 MILLIGRAM(S): at 22:04

## 2022-11-23 RX ADMIN — SODIUM CHLORIDE 1000 MILLILITER(S): 9 INJECTION, SOLUTION INTRAVENOUS at 16:25

## 2022-11-23 RX ADMIN — POLYETHYLENE GLYCOL 3350 17 GRAM(S): 17 POWDER, FOR SOLUTION ORAL at 16:26

## 2022-11-23 RX ADMIN — ENOXAPARIN SODIUM 40 MILLIGRAM(S): 100 INJECTION SUBCUTANEOUS at 06:59

## 2022-11-23 RX ADMIN — Medication 650 MILLIGRAM(S): at 21:21

## 2022-11-23 NOTE — PROGRESS NOTE ADULT - PROBLEM SELECTOR PLAN 2
- 1 episode of urinary incontinence on arrival to the ED room, patient unsure if it occurred due to her being nervous due to being in the ED or due to pain or other cause, denies any back pain, no saddle anesthesia, no incontinence since, no bowel issues, no leg numbness/weakness  - Unclear on cause of incontinence, possibly stress vs urge incontinence, doubt neurological causes as patient neurologically intact and no red flag symptoms  - Would monitor for now 1 episode of urinary incontinence on arrival to the ED room, patient unsure if it occurred due to her being nervous due to being in the ED or due to pain or other cause, denies any back pain, no saddle anesthesia, no incontinence since, no bowel issues, no leg numbness/weakness  - Unclear on cause of incontinence, possibly stress vs urge incontinence, doubt neurological causes as patient neurologically intact and no red flag symptoms  - Would monitor for now, pt denied any further episodes likely secondary bacterial infection of zoster lesion, mildly TTP, ~1x1cm on exam. Erythema surrounding skin, no warmth.  - ctm for need of i&d, abx

## 2022-11-23 NOTE — PROGRESS NOTE ADULT - PROBLEM SELECTOR PLAN 1
- Patient with blistering rash, some areas scabbed over, some still blisters, having new areas of involvement despite treatment with PO valtrex   - c/w acyclovir 500mg IV q8hr  - IVF while getting acyclovir to prevent CARA  - Gabapentin prn for neuropathic pain, oxy IR prn for pain   -ID consulted for disseminated zoster.  -Dermatology consult cancelled  -Consulted ophthalmology to r/o zoster ophthalmicus; negative Ying sign, unlikely involvement. Patient with blistering rash first appearing on Sunday, some areas scabbed over, some still blisters, having new areas of involvement despite treatment with PO valtrex.  - c/w acyclovir IV  - IVF while getting acyclovir to prevent CARA  - Gabapentin prn for neuropathic pain, oxy IR prn for pain   -ID consulted for disseminated zoster, agreed w/ acyclovir  -Dermatology consult cancelled  -Consulted ophthalmology to r/o zoster ophthalmicus; negative Ying sign, unlikely involvement  -monitor for new lesions

## 2022-11-23 NOTE — PROGRESS NOTE ADULT - ASSESSMENT
67 y.o. Female with no significant PMH who presents to the hospital with a blistering and painful rash admitted for disseminated herpes zoster.  67F w/o PMH p/w blistering and painful rash f/w disseminated herpes zoster.

## 2022-11-23 NOTE — PROGRESS NOTE ADULT - PROBLEM SELECTOR PLAN 1
- Patient with blistering rash, some areas scabbed over, some still blisters, having new areas of involvement despite treatment with PO valtrex   - Will place on IV acyclovir, ideal weight based 2/2 elevated BMI  - Gabapentin prn for neuropathic pain, oxy IR prn for pain also  - Consider ID eval  - Patient's R post thigh more concerning for folliculitis rather than herpes zoster, would monitor for now, given since area no clear indication for treatment  - Has blister on L upper cheek but no involvement of the eyes, no eye pain, no vision changes so lower concern for ophthalmic involvement, monitor, if worsening facial rash or patient starts to develop eye symptoms then consider ophthalmology eval for zoster ophthalmaticus - Patient with blistering rash, some areas scabbed over, some still blisters, having new areas of involvement despite treatment with PO valtrex   - c/w acyclovir 500mg IV q8hr  - Gabapentin prn for neuropathic pain, oxy IR prn for pain   -ID consulted for disseminated zoster.  -Dermatology also consulted to r/o other causes of rash  -Consulted ophthalmology to r/o zoster ophthalmicus

## 2022-11-23 NOTE — CONSULT NOTE ADULT - ATTENDING COMMENTS
67 f with no significant PMH, initially developed a vesiuclar rash on L chest shoulder and was given valtrex which she took for 7 days but started to have new lesions on R forearm, R thigh and now some erythema on L face/nose and pain on the L face, no eye pain or change in the vision   afebrile, WBC normal, CR: 0.9  HIV negative    disseminated zoster, started with a vesicular rash on L chest/shoulder but developed more on R forearm, R thigh and now some erythema on L face/nose and pain on the L face, no eye pain or change in the vision, pt is not immunocompromised    *  increase acyclovir to 650mg IV q8H based on adjusted body weight  * IVFs with acyclovir  * monitor Cr  * airborne and contact precautions  * f/u with ophthalmology and derm    The above assessment and plan was discussed with the primary team    Rama Diaz MD  contact on teams  After 5pm and on weekends call [phone]

## 2022-11-23 NOTE — CONSULT NOTE ADULT - SUBJECTIVE AND OBJECTIVE BOX
Patient is a 67y old  Female who presents with a chief complaint of Disseminated Herpes Zoster (23 Nov 2022 05:49)    HPI:  This is a 67F with no PMH who presents to the hospital with complaints of a painful blistering rash since last week. Said that the rash first started on her L chest/shoulder area last week around Sunday, went to [facility] on Monday and was told that she had shingles and was given valtrex for 7 days. Took the valtrex but still continued to have blisters in different parts of her body and therefore came to the hospital for further evaluation. Currently states that she still has rashes on different areas and still getting new rashes. Has pain with her rash, describes it as a "thousand needles pricking her skin". Took tylenol for 3 days, 1-2 times a day, without significant improvement in her pain. Recently started to have pain around her L paranasal area and noted a new blister there also, no eye pain/vision changes though. Has a mild HA but no neurological complaints. Had 1 episode of urinary incontinence earlier while getting herself situated in the ED but denied any associated back pain, saddle anesthesia, bowel incontinence, urinary incontinence at baseline, or leg weakness/numbness. No urinary complaints. No other acute complaints.     On arrival to the ED, her vitals were T 98.2, P 70, /71, RR 18, O2 sat 100% RA. Her lab work did not show acute abnormalities. Her HIV test for negative. She was given acyclovir 700mg IVPB x1. She was admitted to medicine for further management.  (23 Nov 2022 05:49)     ID consulted for suspected disseminated zoster    REVIEW OF SYSTEMS  [  ] ROS unobtainable because:    [ x ] All other systems negative except as noted below    Constitutional:  [ ] fever [ ] chills  [ ] weight loss  [ ]night sweat  [ ]poor appetite/PO intake [ ]fatigue   Skin:  [ ] rash [ ] phlebitis	  Eyes: [ ] icterus [ ] pain  [ ] discharge	  ENMT: [ ] sore throat  [ ] thrush [ ] ulcers [ ] exudates [ ]anosmia  Respiratory: [ ] dyspnea [ ] hemoptysis [ ] cough [ ] sputum	  Cardiovascular:  [ ] chest pain [ ] palpitations [ ] edema	  Gastrointestinal:  [ ] nausea [ ] vomiting [ ] diarrhea [ ] constipation [ ] pain	  Genitourinary:  [ ] dysuria [ ] frequency [ ] hematuria [ ] discharge [ ] flank pain  [ ] incontinence  Musculoskeletal:  [ ] myalgias [ ] arthralgias [ ] arthritis  [ ] back pain  Neurological:  [ ] headache [ ] weakness [ ] seizures  [ ] confusion/altered mental status    prior hospital charts reviewed [V]  primary team notes reviewed [V]  other consultant notes reviewed [V]    PAST MEDICAL & SURGICAL HISTORY:  No pertinent past medical history    History of breast surgery    H/O left knee surgery    History of hand surgery    S/P cholecystectomy      FAMILY HISTORY:  No pertinent family history in first degree relatives    SOCIAL HISTORY:  Denied smoking    Allergies  No Known Allergies      ANTIMICROBIALS:  acyclovir IVPB 500 every 8 hours    ANTIMICROBIALS (past 90 days):   MEDICATIONS  (STANDING):    acyclovir IVPB   264 mL/Hr IV Intermittent (11-23-22 @ 02:17)    MEDICATIONS  (STANDING):  acetaminophen     Tablet .. 650 every 6 hours PRN  aluminum hydroxide/magnesium hydroxide/simethicone Suspension 30 every 4 hours PRN  bisacodyl 5 daily PRN  enoxaparin Injectable 40 every 12 hours  gabapentin 100 three times a day PRN  melatonin 3 at bedtime PRN  ondansetron Injectable 4 every 8 hours PRN  oxyCODONE    IR 2.5 every 4 hours PRN  oxyCODONE    IR 5 every 4 hours PRN  polyethylene glycol 3350 17 daily  senna 2 at bedtime      VITALS:  Vital Signs Last 24 Hrs  T(F): 97.7 (11-23-22 @ 07:11), Max: 98.3 (11-22-22 @ 23:56)  Vital Signs Last 24 Hrs  HR: 67 (11-23-22 @ 07:11) (67 - 76)  BP: 114/54 (11-23-22 @ 07:11) (110/58 - 149/71)  RR: 16 (11-23-22 @ 07:11)  SpO2: 100% (11-23-22 @ 07:11) (100% - 100%)  Wt(kg): --    PHYSICAL EXAM:  Constitutional: non-toxic, no distress  HEAD/EYES: anicteric, no conjunctival injection  ENT:  supple, no thrush  Cardiovascular:   normal S1/S2, no murmur, no edema  Respiratory:  clear BS bilaterally, no wheezes, no rales  GI:  soft, non-tender, normal bowel sounds  :  no bowles, no CVA tenderness  Musculoskeletal:  no synovitis, normal ROM  Neurologic: awake and alert,  no focal findings  Skin:  no rash, no erythema, no phlebitis  Heme/Onc: no lymphadenopathy   Psychiatric:  awake, alert, appropriate mood    Labs:                        12.6   5.76  )-----------( 259      ( 23 Nov 2022 00:44 )             38.4     11-23    141  |  106  |  19  ----------------------------<  107<H>  3.7   |  25  |  0.91    Ca    8.8      23 Nov 2022 00:44    TPro  6.8  /  Alb  4.1  /  TBili  0.2  /  DBili  x   /  AST  16  /  ALT  13  /  AlkPhos  59  11-23    WBC Trend:  WBC Count: 5.76 (11-23-22 @ 00:44)    Auto Neutrophil #: 2.17 K/uL (11-23-22 @ 00:44)    Auto Eosinophil %: 4.3 % (11-23-22 @ 00:44)    MICROBIOLOGY:  HIV-1/2 Combo Result: Nonreact (11-23-22 @ 00:44)      RADIOLOGY:  n/a   Patient is a 67y old  Female who presents with a chief complaint of Disseminated Herpes Zoster (23 Nov 2022 05:49)    HPI:  This is a 67F with no PMH who presents to the hospital with complaints of a painful blistering rash since last week. Said that the rash first started on her L chest/shoulder area last week around Sunday, [place] [facility] [place] was told that she had shingles and was given valtrex for 7 days. Took the valtrex but still continued to have blisters in different parts of her body and therefore came to the hospital for further evaluation. Currently states that she still has rashes on different areas and still getting new rashes. Has pain with her rash, describes it as a "thousand needles pricking her skin". Took tylenol for 3 days, 1-2 times a day, without significant improvement in her pain. Recently started to have pain around her L paranasal area and noted a new blister there also, no eye pain/vision changes though. Has a mild HA but no neurological complaints. Had 1 episode of urinary incontinence earlier while getting herself situated in the ED but denied any associated back pain, saddle anesthesia, bowel incontinence, urinary incontinence at baseline, or leg weakness/numbness. No urinary complaints. No other acute complaints.     On arrival to the ED, her vitals were T 98.2, P 70, /71, RR 18, O2 sat 100% RA. Her lab work did not show acute abnormalities. Her HIV test for negative. She was given acyclovir 700mg IVPB x1. She was admitted to medicine for further management.  (23 Nov 2022 05:49)     ID consulted for suspected disseminated zoster    REVIEW OF SYSTEMS  [  ] ROS unobtainable because:    [ x ] All other systems negative except as noted below    Constitutional:  [ ] fever [ ] chills  [ ] weight loss  [ ]night sweat  [ ]poor appetite/PO intake [ ]fatigue   Skin:  [ ] rash [ ] phlebitis	  Eyes: [ ] icterus [ ] pain  [ ] discharge	  ENMT: [ ] sore throat  [ ] thrush [ ] ulcers [ ] exudates [ ]anosmia  Respiratory: [ ] dyspnea [ ] hemoptysis [ ] cough [ ] sputum	  Cardiovascular:  [ ] chest pain [ ] palpitations [ ] edema	  Gastrointestinal:  [ ] nausea [ ] vomiting [ ] diarrhea [ ] constipation [ ] pain	  Genitourinary:  [ ] dysuria [ ] frequency [ ] hematuria [ ] discharge [ ] flank pain  [ ] incontinence  Musculoskeletal:  [ ] myalgias [ ] arthralgias [ ] arthritis  [ ] back pain  Neurological:  [ ] headache [ ] weakness [ ] seizures  [ ] confusion/altered mental status    prior hospital charts reviewed [V]  primary team notes reviewed [V]  other consultant notes reviewed [V]    PAST MEDICAL & SURGICAL HISTORY:  No pertinent past medical history    History of breast surgery    H/O left knee surgery    History of hand surgery    S/P cholecystectomy      FAMILY HISTORY:  No pertinent family history in first degree relatives    SOCIAL HISTORY:  Denied smoking    Allergies  No Known Allergies      ANTIMICROBIALS:  acyclovir IVPB 500 every 8 hours    ANTIMICROBIALS (past 90 days):   MEDICATIONS  (STANDING):    acyclovir IVPB   264 mL/Hr IV Intermittent (11-23-22 @ 02:17)    MEDICATIONS  (STANDING):  acetaminophen     Tablet .. 650 every 6 hours PRN  aluminum hydroxide/magnesium hydroxide/simethicone Suspension 30 every 4 hours PRN  bisacodyl 5 daily PRN  enoxaparin Injectable 40 every 12 hours  gabapentin 100 three times a day PRN  melatonin 3 at bedtime PRN  ondansetron Injectable 4 every 8 hours PRN  oxyCODONE    IR 2.5 every 4 hours PRN  oxyCODONE    IR 5 every 4 hours PRN  polyethylene glycol 3350 17 daily  senna 2 at bedtime      VITALS:  Vital Signs Last 24 Hrs  T(F): 97.7 (11-23-22 @ 07:11), Max: 98.3 (11-22-22 @ 23:56)  Vital Signs Last 24 Hrs  HR: 67 (11-23-22 @ 07:11) (67 - 76)  BP: 114/54 (11-23-22 @ 07:11) (110/58 - 149/71)  RR: 16 (11-23-22 @ 07:11)  SpO2: 100% (11-23-22 @ 07:11) (100% - 100%)  Wt(kg): --    PHYSICAL EXAM:  Constitutional: non-toxic, no distress  HEAD/EYES: anicteric, no conjunctival injection  ENT:  supple, no thrush  Cardiovascular:   +S1/S2  Respiratory:  +BS bilaterally  GI:  soft, non-tender, +bowel sounds  :  no bowles, no CVA tenderness  Musculoskeletal:  no synovitis, normal ROM  Neurologic: awake and alert,  no focal findings  Skin:  +healing vesicular rash on L chest/neck and R forearm, no open lesions  Heme/Onc: no lymphadenopathy   Psychiatric:  awake, alert, appropriate mood    Labs:                        12.6   5.76  )-----------( 259      ( 23 Nov 2022 00:44 )             38.4     11-23    141  |  106  |  19  ----------------------------<  107<H>  3.7   |  25  |  0.91    Ca    8.8      23 Nov 2022 00:44    TPro  6.8  /  Alb  4.1  /  TBili  0.2  /  DBili  x   /  AST  16  /  ALT  13  /  AlkPhos  59  11-23    WBC Trend:  WBC Count: 5.76 (11-23-22 @ 00:44)    Auto Neutrophil #: 2.17 K/uL (11-23-22 @ 00:44)    Auto Eosinophil %: 4.3 % (11-23-22 @ 00:44)    MICROBIOLOGY:  HIV-1/2 Combo Result: Nonreact (11-23-22 @ 00:44)      RADIOLOGY:  n/a   Patient is a 67y old  Female who presents with a chief complaint of Disseminated Herpes Zoster (23 Nov 2022 05:49)    HPI:  This is a 67F with no PMH who presents to the hospital with complaints of a painful blistering rash since last week. Said that the rash first started on her L chest/shoulder area last week around Sunday, went to [facility] on Monday and was told that she had shingles and was given valtrex for 7 days. Took the valtrex but still continued to have blisters in different parts of her body and therefore came to the hospital for further evaluation. Currently states that she still has rashes on different areas and still getting new rashes. Has pain with her rash, describes it as a "thousand needles pricking her skin". Took tylenol for 3 days, 1-2 times a day, without significant improvement in her pain. Recently started to have pain around her L paranasal area and noted a new blister there also, no eye pain/vision changes though. Has a mild HA but no neurological complaints. Had 1 episode of urinary incontinence earlier while getting herself situated in the ED but denied any associated back pain, saddle anesthesia, bowel incontinence, urinary incontinence at baseline, or leg weakness/numbness. No urinary complaints. No other acute complaints.     On arrival to the ED, her vitals were T 98.2, P 70, /71, RR 18, O2 sat 100% RA. Her lab work did not show acute abnormalities. Her HIV test for negative. She was given acyclovir 700mg IVPB x1. She was admitted to medicine for further management.  (23 Nov 2022 05:49)     ID consulted for suspected disseminated zoster    REVIEW OF SYSTEMS  [  ] ROS unobtainable because:    [ x ] All other systems negative except as noted below    Constitutional:  [ ] fever [ ] chills  [ ] weight loss  [ ]night sweat  [ ]poor appetite/PO intake [ ]fatigue   Skin:  [ ] rash [ ] phlebitis	  Eyes: [ ] icterus [ ] pain  [ ] discharge	  ENMT: [ ] sore throat  [ ] thrush [ ] ulcers [ ] exudates [ ]anosmia  Respiratory: [ ] dyspnea [ ] hemoptysis [ ] cough [ ] sputum	  Cardiovascular:  [ ] chest pain [ ] palpitations [ ] edema	  Gastrointestinal:  [ ] nausea [ ] vomiting [ ] diarrhea [ ] constipation [ ] pain	  Genitourinary:  [ ] dysuria [ ] frequency [ ] hematuria [ ] discharge [ ] flank pain  [ ] incontinence  Musculoskeletal:  [ ] myalgias [ ] arthralgias [ ] arthritis  [ ] back pain  Neurological:  [ ] headache [ ] weakness [ ] seizures  [ ] confusion/altered mental status    prior hospital charts reviewed [V]  primary team notes reviewed [V]  other consultant notes reviewed [V]    PAST MEDICAL & SURGICAL HISTORY:  No pertinent past medical history    History of breast surgery    H/O left knee surgery    History of hand surgery    S/P cholecystectomy      FAMILY HISTORY:  No pertinent family history in first degree relatives    SOCIAL HISTORY:  Born in [facility]  Lives with , no sick contacts    Allergies  No Known Allergies      ANTIMICROBIALS:  acyclovir IVPB 500 every 8 hours    ANTIMICROBIALS (past 90 days):   MEDICATIONS  (STANDING):    acyclovir IVPB   264 mL/Hr IV Intermittent (11-23-22 @ 02:17)    MEDICATIONS  (STANDING):  acetaminophen     Tablet .. 650 every 6 hours PRN  aluminum hydroxide/magnesium hydroxide/simethicone Suspension 30 every 4 hours PRN  bisacodyl 5 daily PRN  enoxaparin Injectable 40 every 12 hours  gabapentin 100 three times a day PRN  melatonin 3 at bedtime PRN  ondansetron Injectable 4 every 8 hours PRN  oxyCODONE    IR 2.5 every 4 hours PRN  oxyCODONE    IR 5 every 4 hours PRN  polyethylene glycol 3350 17 daily  senna 2 at bedtime      VITALS:  Vital Signs Last 24 Hrs  T(F): 97.7 (11-23-22 @ 07:11), Max: 98.3 (11-22-22 @ 23:56)  Vital Signs Last 24 Hrs  HR: 67 (11-23-22 @ 07:11) (67 - 76)  BP: 114/54 (11-23-22 @ 07:11) (110/58 - 149/71)  RR: 16 (11-23-22 @ 07:11)  SpO2: 100% (11-23-22 @ 07:11) (100% - 100%)  Wt(kg): --    PHYSICAL EXAM:  Constitutional: non-toxic, no distress  HEAD/EYES: anicteric, no conjunctival injection  ENT:  supple, no thrush  Cardiovascular:   +S1/S2  Respiratory:  +BS bilaterally  GI:  soft, non-tender, +bowel sounds  :  no bowles, no CVA tenderness  Musculoskeletal:  no synovitis, normal ROM  Neurologic: awake and alert,  no focal findings  Skin:  +healing vesicular rash on L chest/neck and R forearm, no open lesions  Heme/Onc: no lymphadenopathy   Psychiatric:  awake, alert, appropriate mood    Labs:                        12.6   5.76  )-----------( 259      ( 23 Nov 2022 00:44 )             38.4     11-23    141  |  106  |  19  ----------------------------<  107<H>  3.7   |  25  |  0.91    Ca    8.8      23 Nov 2022 00:44    TPro  6.8  /  Alb  4.1  /  TBili  0.2  /  DBili  x   /  AST  16  /  ALT  13  /  AlkPhos  59  11-23    WBC Trend:  WBC Count: 5.76 (11-23-22 @ 00:44)    Auto Neutrophil #: 2.17 K/uL (11-23-22 @ 00:44)    Auto Eosinophil %: 4.3 % (11-23-22 @ 00:44)    MICROBIOLOGY:  HIV-1/2 Combo Result: Nonreact (11-23-22 @ 00:44)      RADIOLOGY:  n/a   Patient is a 67y old  Female who presents with a chief complaint of Disseminated Herpes Zoster (23 Nov 2022 05:49)    HPI:  This is a 67F with no PMH who presents to the hospital with complaints of a painful blistering rash since last week. Said that the rash first started on her L chest/shoulder area last week around Sunday, went to [facility] [place] on Monday and was told that she had shingles and was given valtrex for 7 days. Took the valtrex but still continued to have blisters in different parts of her body and therefore came to the hospital for further evaluation. Currently states that she still has rashes on different areas and still getting new rashes. Has pain with her rash, describes it as a "thousand needles pricking her skin". Took tylenol for 3 days, 1-2 times a day, without significant improvement in her pain. Recently started to have pain around her L paranasal area and noted a new blister there also, no eye pain/vision changes though. Has a mild HA but no neurological complaints. Had 1 episode of urinary incontinence earlier while getting herself situated in the ED but denied any associated back pain, saddle anesthesia, bowel incontinence, urinary incontinence at baseline, or leg weakness/numbness. No urinary complaints. No other acute complaints.     On arrival to the ED, her vitals were T 98.2, P 70, /71, RR 18, O2 sat 100% RA. Her lab work did not show acute abnormalities. Her HIV test for negative. She was given acyclovir 700mg IVPB x1. She was admitted to medicine for further management.  (23 Nov 2022 05:49)     ID consulted for suspected disseminated zoster    REVIEW OF SYSTEMS  [  ] ROS unobtainable because:    [ x ] All other systems negative except as noted below    Constitutional:  [ ] fever [ ] chills  [ ] weight loss  [ ]night sweat  [ ]poor appetite/PO intake [ ]fatigue   Skin:  [x ] rash [ ] phlebitis	  Eyes: [ ] icterus [ ] pain  [ ] discharge	  ENMT: pain in L face  Respiratory: [ ] dyspnea [ ] hemoptysis [ ] cough [ ] sputum	  Cardiovascular:  [ ] chest pain [ ] palpitations [ ] edema	  Gastrointestinal:  [ ] nausea [ ] vomiting [ ] diarrhea [ ] constipation [ ] pain	  Genitourinary:  [ ] dysuria [ ] frequency [ ] hematuria [ ] discharge [ ] flank pain  [ ] incontinence  Musculoskeletal:  [ ] myalgias [ ] arthralgias [ ] arthritis  [ ] back pain  Neurological:  [ ] headache [ ] weakness [ ] seizures  [ ] confusion/altered mental status  psych: no anxiety  prior hospital charts reviewed [V]  primary team notes reviewed [V]  other consultant notes reviewed [V]    PAST MEDICAL & SURGICAL HISTORY:  No pertinent past medical history    History of breast surgery    H/O left knee surgery    History of hand surgery    S/P cholecystectomy      FAMILY HISTORY:  No pertinent family history in first degree relatives    SOCIAL HISTORY:  Born in [facility]  , Lives with , no sick contacts  no recent travel  Allergies  No Known Allergies      ANTIMICROBIALS:  acyclovir IVPB 500 every 8 hours    ANTIMICROBIALS (past 90 days):   MEDICATIONS  (STANDING):    acyclovir IVPB   264 mL/Hr IV Intermittent (11-23-22 @ 02:17)    MEDICATIONS  (STANDING):  acetaminophen     Tablet .. 650 every 6 hours PRN  aluminum hydroxide/magnesium hydroxide/simethicone Suspension 30 every 4 hours PRN  bisacodyl 5 daily PRN  enoxaparin Injectable 40 every 12 hours  gabapentin 100 three times a day PRN  melatonin 3 at bedtime PRN  ondansetron Injectable 4 every 8 hours PRN  oxyCODONE    IR 2.5 every 4 hours PRN  oxyCODONE    IR 5 every 4 hours PRN  polyethylene glycol 3350 17 daily  senna 2 at bedtime      VITALS:  Vital Signs Last 24 Hrs  T(F): 97.7 (11-23-22 @ 07:11), Max: 98.3 (11-22-22 @ 23:56)  Vital Signs Last 24 Hrs  HR: 67 (11-23-22 @ 07:11) (67 - 76)  BP: 114/54 (11-23-22 @ 07:11) (110/58 - 149/71)  RR: 16 (11-23-22 @ 07:11)  SpO2: 100% (11-23-22 @ 07:11) (100% - 100%)  Wt(kg): --    PHYSICAL EXAM:  Constitutional: non-toxic, no distress  HEAD: atraumatic, normocephalic  EYES: anicteric, no conjunctival injection  ENT:  supple, no thrush  Cardiovascular:   +S1/S2  Respiratory:  +BS bilaterally  GI:  soft, non-tender, +bowel sounds  :  no bowles, no CVA tenderness  Musculoskeletal:  no synovitis, normal ROM  Neurologic: awake and alert,  no focal findings  Skin:  +healing vesicular rash on L chest/neck and R forearm, no open lesions, one large erythematous lesion on R thigh, some erythema on L face  vascular: no phlebitis  Heme/Onc: no lymphadenopathy   Psychiatric:  awake, alert, appropriate mood    Labs:                        12.6   5.76  )-----------( 259      ( 23 Nov 2022 00:44 )             38.4     11-23    141  |  106  |  19  ----------------------------<  107<H>  3.7   |  25  |  0.91    Ca    8.8      23 Nov 2022 00:44    TPro  6.8  /  Alb  4.1  /  TBili  0.2  /  DBili  x   /  AST  16  /  ALT  13  /  AlkPhos  59  11-23    WBC Trend:  WBC Count: 5.76 (11-23-22 @ 00:44)    Auto Neutrophil #: 2.17 K/uL (11-23-22 @ 00:44)    Auto Eosinophil %: 4.3 % (11-23-22 @ 00:44)    MICROBIOLOGY:  HIV-1/2 Combo Result: Nonreact (11-23-22 @ 00:44)      RADIOLOGY:  n/a

## 2022-11-23 NOTE — PROGRESS NOTE ADULT - ASSESSMENT
67 y.o. Female with no significant PMH who presents to the hospital with a blistering and painful rash admitted for disseminated herpes zoster.

## 2022-11-23 NOTE — H&P ADULT - PROBLEM SELECTOR PLAN 1
- Patient with blistering rash, some areas scabbed over, some still blisters, having new areas of involvement despite treatment with PO valtrex   - Will place on IV acyclovir, ideal weight based 2/2 elevated BMI  - Gabapentin prn for neuropathic pain, oxy IR prn for pain also  - Consider ID eval  - Patient's R post thigh more concerning for folliculitis rather than herpes zoster, would monitor for now, given since area no clear indication for treatment  - Has blister on L upper cheek but no involvement of the eyes, no eye pain, no vision changes so lower concern for ophthalmic involvement, monitor, if worsening facial rash or patient starts to develop eye symptoms then consider ophthalmology eval for zoster ophthalmaticus

## 2022-11-23 NOTE — H&P ADULT - ASSESSMENT
This is a 67F with no significant PMH who presents to the hospital with a blistering and painful rash admitted for disseminated herpes zoster.

## 2022-11-23 NOTE — PATIENT PROFILE ADULT - FOOD INSECURITY
no [NI] : Allergic [Nl] : Endocrine [Frequent URIs] : frequent upper respiratory infections [Snoring] : snoring [Recurrent Ear Infections] : recurrent ear infections [Wheezing] : wheezing [Cough] : cough [Pneumonia] : pneumonia [Seizure] : seizures [Birth Marks] : birth marks [Immunizations are up to date] : Immunizations are up to date [Influenza Vaccine this Past Year] : Influenza vaccine this past year [Heart Disease] : no heart disease [Spitting Up] : not spitting up [Muscle Weakness] : no muscle weakness [Eczema] : no ezcema [FreeTextEntry4] : "mild sleep apnea last study was last summer 2017 [FreeTextEntry6] : croup X 2 [FreeTextEntry8] : 2 febrile seizures, migraines no f/u needed by neuro [de-identified] : left foot light brown birth chelita [FreeTextEntry1] : Received flu shot [id number]

## 2022-11-23 NOTE — H&P ADULT - NSHPPHYSICALEXAM_GEN_ALL_CORE
Vital Signs Last 24 Hrs  T(C): 36.6 (23 Nov 2022 03:52), Max: 36.8 (22 Nov 2022 20:55)  T(F): 97.8 (23 Nov 2022 03:52), Max: 98.3 (22 Nov 2022 23:56)  HR: 68 (23 Nov 2022 03:52) (68 - 76)  BP: 110/58 (23 Nov 2022 03:52) (110/58 - 149/71)  BP(mean): --  RR: 16 (23 Nov 2022 03:52) (16 - 18)  SpO2: 100% (23 Nov 2022 03:52) (100% - 100%)    Parameters below as of 23 Nov 2022 03:52  Patient On (Oxygen Delivery Method): room air    GENERAL: No acute distress, well-developed  EYES: EOMI, PERRLA, conjunctiva and sclera clear  ENT: Neck supple, No JVD, moist mucosa  CHEST/LUNG: Clear to auscultation bilaterally; No wheeze, equal breath sounds bilaterally   BACK: No spinal tenderness, no CVA TTP  HEART: Regular rate and rhythm; No murmurs, rubs, or gallops  ABDOMEN: Soft, Nontender, Nondistended; Bowel sounds present  EXTREMITIES: +DP/PT/Radial pulses, No clubbing, cyanosis, or edema  PSYCH: Nl behavior, nl affect  NEUROLOGY: AAOx3, non-focal, cranial nerves intact  SKIN: +blistering rash on L shoulder/chest, +scabbed rash on RUE, 1 blister on L upper cheek, 1 scabbed blister on R lateral ankle and R lower calf, No blisters noted on back or abdomen, likely folliculitis on R post thigh, no open wounds

## 2022-11-23 NOTE — CONSULT NOTE ADULT - SUBJECTIVE AND OBJECTIVE BOX
[facility] DEPARTMENT OF OPHTHALMOLOGY - INITIAL ADULT CONSULT  -----------------------------------------------------------------------------------------------------------------  Rob Troncoso MD PGY 3  [phone]  -----------------------------------------------------------------------------------------------------------------    HPI: 67F with no significant PMH currently admitted for painful blistering rash around her left chest and shoulder, admitted for suspected disseminated zoster. Was on Valtrex for 7 days and discontinued however still endorsing a needle prickling sensation in her rashes. Endorses the same sensation in her left maxillary area associated with tearing of the left eye. Patient denies any vision changes, eye pain, blurry vision, loss of vision, flashes, floaters, or redness of the eyes.    PAST MEDICAL & SURGICAL HISTORY:  No pertinent past medical history      History of breast surgery      H/O left knee surgery      History of hand surgery      S/P cholecystectomy        Past Ocular History: none  Ophthalmic Medications: none  FAMILY HISTORY:  No pertinent family history in first degree relatives      Social History: denies etoh/tobacco    MEDICATIONS  (STANDING):  acyclovir IVPB 650 milliGRAM(s) IV Intermittent every 8 hours  enoxaparin Injectable 40 milliGRAM(s) SubCutaneous every 12 hours  naloxone Injectable 0.4 milliGRAM(s) IV Push once  polyethylene glycol 3350 17 Gram(s) Oral daily  senna 2 Tablet(s) Oral at bedtime    MEDICATIONS  (PRN):  acetaminophen     Tablet .. 650 milliGRAM(s) Oral every 6 hours PRN Temp greater or equal to 38C (100.4F), Mild Pain (1 - 3)  aluminum hydroxide/magnesium hydroxide/simethicone Suspension 30 milliLiter(s) Oral every 4 hours PRN Dyspepsia  bisacodyl 5 milliGRAM(s) Oral daily PRN Constipation  gabapentin 100 milliGRAM(s) Oral three times a day PRN Herpetic neuralgia  melatonin 3 milliGRAM(s) Oral at bedtime PRN Insomnia  ondansetron Injectable 4 milliGRAM(s) IV Push every 8 hours PRN Nausea and/or Vomiting  oxyCODONE    IR 2.5 milliGRAM(s) Oral every 4 hours PRN Moderate Pain (4 - 6)  oxyCODONE    IR 5 milliGRAM(s) Oral every 4 hours PRN Severe Pain (7 - 10)    Allergies & Intolerances:     Review of Systems:  Constitutional: No fever, chills  Eyes: No blurry vision, flashes, floaters, FBS, erythema, discharge, double vision, OU  Neuro: No tremors  Cardiovascular: No chest pain, palpitations  Respiratory: No SOB, no cough  GI: No nausea, vomiting, abdominal pain  : No dysuria  Skin: no rash  Psych: no depression  Endocrine: no polyuria, polydipsia  Heme/lymph: no swelling    VITALS: T(C): 36.5 (11-23-22 @ 07:11)  T(F): 97.7 (11-23-22 @ 07:11), Max: 98.3 (11-22-22 @ 23:56)  HR: 67 (11-23-22 @ 07:11) (67 - 76)  BP: 114/54 (11-23-22 @ 07:11) (110/58 - 149/71)  RR:  (16 - 18)  SpO2:  (100% - 100%)  Wt(kg): --  General: AAO x 3, appropriate mood and affect    Ophthalmology Exam:  Visual acuity (sc): 20/20 OD, 20/25 OS  Pupils: PERRL OU, no APD  Ttono: 17 OD, 19 OS  Extraocular movements (EOMs): Full OU, no pain, no diplopia  Confrontational Visual Field (CVF): Full OD, full OS    Pen Light Exam (PLE)  External: Flat OU; no periorbital skin lesions OU; negative Ying's sign  Lids/Lashes/Lacrimal Ducts: Flat OU    Sclera/Conjunctiva: W+Q OU; no injection OU  Cornea: Cl OU; no dendrites/pseudodendrites OU  Anterior Chamber: D+F OU    Iris: Flat OU  Lens: NS OU    Fundus Exam: dilated with 1% tropicamide and 2.5% phenylephrine  Approval obtained from primary team for dilation  Patient aware that pupils can remained dilated for at least 4-6 hours  Exam performed with 20D lens    Vitreous: wnl OU  Disc, cup/disc: sharp and pink, 0.4 OU  Macula: wnl OU  Vessels: wnl OU  Periphery: wnl OU   [facility] DEPARTMENT OF OPHTHALMOLOGY - INITIAL ADULT CONSULT  -----------------------------------------------------------------------------------------------------------------  Rob Troncoso MD PGY 3  [phone]  -----------------------------------------------------------------------------------------------------------------    HPI: 67F with no significant PMH currently admitted for painful blistering rash around her left chest and shoulder, admitted for suspected disseminated zoster. Was on Valtrex for 7 days and discontinued however still endorsing a needle prickling sensation in her rashes. Endorses the same sensation in her left maxillary area associated with tearing of the left eye. Patient denies any vision changes, eye pain, blurry vision, loss of vision, flashes, floaters, or redness of the eyes.    PAST MEDICAL & SURGICAL HISTORY:  No pertinent past medical history      History of breast surgery      H/O left knee surgery      History of hand surgery      S/P cholecystectomy        Past Ocular History: none  Ophthalmic Medications: none  FAMILY HISTORY:  No pertinent family history in first degree relatives      Social History: denies etoh/tobacco    MEDICATIONS  (STANDING):  acyclovir IVPB 650 milliGRAM(s) IV Intermittent every 8 hours  enoxaparin Injectable 40 milliGRAM(s) SubCutaneous every 12 hours  naloxone Injectable 0.4 milliGRAM(s) IV Push once  polyethylene glycol 3350 17 Gram(s) Oral daily  senna 2 Tablet(s) Oral at bedtime    MEDICATIONS  (PRN):  acetaminophen     Tablet .. 650 milliGRAM(s) Oral every 6 hours PRN Temp greater or equal to 38C (100.4F), Mild Pain (1 - 3)  aluminum hydroxide/magnesium hydroxide/simethicone Suspension 30 milliLiter(s) Oral every 4 hours PRN Dyspepsia  bisacodyl 5 milliGRAM(s) Oral daily PRN Constipation  gabapentin 100 milliGRAM(s) Oral three times a day PRN Herpetic neuralgia  melatonin 3 milliGRAM(s) Oral at bedtime PRN Insomnia  ondansetron Injectable 4 milliGRAM(s) IV Push every 8 hours PRN Nausea and/or Vomiting  oxyCODONE    IR 2.5 milliGRAM(s) Oral every 4 hours PRN Moderate Pain (4 - 6)  oxyCODONE    IR 5 milliGRAM(s) Oral every 4 hours PRN Severe Pain (7 - 10)    Allergies & Intolerances:     Review of Systems:  Constitutional: No fever, chills  Eyes: No blurry vision, flashes, floaters, FBS, erythema, discharge, double vision, OU  Neuro: No tremors  Cardiovascular: No chest pain, palpitations  Respiratory: No SOB, no cough  GI: No nausea, vomiting, abdominal pain  : No dysuria  Skin: no rash  Psych: no depression  Endocrine: no polyuria, polydipsia  Heme/lymph: no swelling    VITALS: T(C): 36.5 (11-23-22 @ 07:11)  T(F): 97.7 (11-23-22 @ 07:11), Max: 98.3 (11-22-22 @ 23:56)  HR: 67 (11-23-22 @ 07:11) (67 - 76)  BP: 114/54 (11-23-22 @ 07:11) (110/58 - 149/71)  RR:  (16 - 18)  SpO2:  (100% - 100%)  Wt(kg): --  General: AAO x 3, appropriate mood and affect    Ophthalmology Exam:  Visual acuity (sc): 20/20 OD, 20/25 OS  Pupils: PERRL OU, no APD  Ttono: 17 OD, 19 OS  Extraocular movements (EOMs): Full OU, no pain, no diplopia  Confrontational Visual Field (CVF): Full OD, full OS    Pen Light Exam (PLE)  External: Flat OU; no periorbital skin lesions OU; negative Ying's sign  Lids/Lashes/Lacrimal Ducts: Flat OU    Sclera/Conjunctiva: W+Q OU; no injection OU  Cornea: Cl OU; no dendrites/pseudodendrites OU  Anterior Chamber: D+F OU    Iris: Flat OU  Lens: NS OU    Fundus Exam: dilated with 1% tropicamide and 2.5% phenylephrine  Approval obtained from primary team for dilation  Patient aware that pupils can remained dilated for at least 4-6 hours  Exam performed with 20D lens    Vitreous: wnl OU  Disc, cup/disc: sharp and pink, 0.4 OD, 0.8 OS  Macula: wnl OU  Vessels: wnl OU  Periphery: wnl OU

## 2022-11-23 NOTE — H&P ADULT - NSHPREVIEWOFSYSTEMS_GEN_ALL_CORE
REVIEW OF SYSTEMS:    CONSTITUTIONAL: +Generalized fatigue, No fevers or chills  EYES: No visual changes or eye discharge  ENT: No rhinorrhea or sore throat  NECK: No pain or stiffness  RESPIRATORY: No cough, wheezing, hemoptysis; No shortness of breath  CARDIOVASCULAR: No chest pain or palpitations; No lower extremity edema  GASTROINTESTINAL: No abdominal or epigastric pain. No nausea, vomiting, or hematemesis; No diarrhea or constipation. No melena or hematochezia.  BACK: No back pain  GENITOURINARY: +1 episode of urinary incontinence, No dysuria, frequency or hematuria  NEUROLOGICAL: No numbness or weakness  SKIN: +Blistering rash on several parts of her body with associated neuropathic pain, no wounds

## 2022-11-23 NOTE — CONSULT NOTE ADULT - SUBJECTIVE AND OBJECTIVE BOX
HPI:  This is a 67F with no PMH who presents to the hospital with complaints of a painful blistering rash since last week. Said that the rash first started on her L chest/shoulder area last week around Sunday, went [place] [facility] [place] and was told that she had shingles and was given valtrex for 7 days. Took the valtrex but still continued to have blisters in different parts of her body and therefore came to the hospital for further evaluation. Currently states that she still has rashes on different areas and still getting new rashes. Has pain with her rash, describes it as a "thousand needles pricking her skin". Took tylenol for 3 days, 1-2 times a day, without significant improvement in her pain. Recently started to have pain around her L paranasal area and noted a new blister there also, no eye pain/vision changes though. Has a mild HA but no neurological complaints. Had 1 episode of urinary incontinence earlier while getting herself situated in the ED but denied any associated back pain, saddle anesthesia, bowel incontinence, urinary incontinence at baseline, or leg weakness/numbness. No urinary complaints. No other acute complaints.     On arrival to the ED, her vitals were T 98.2, P 70, /71, RR 18, O2 sat 100% RA. Her lab work did not show acute abnormalities. Her HIV test for negative. She was given acyclovir 700mg IVPB x1. She was admitted to medicine for further management.  (23 Nov 2022 05:49)      PAST MEDICAL & SURGICAL HISTORY:  No pertinent past medical history  History of breast surgery  H/O left knee surgery  History of hand surgery  S/P cholecystectomy    REVIEW OF SYSTEMS:  General: no fevers/chills; no lethargy  Skin/Breast: see HPI  Ophthalmologic: no eye pain or changes in vision  ENT: no dysphagia or changes in hearing  Respiratory: no SOB or cough  Cardiovascular: no palpitations or chest pain  Gastrointestinal: no abdominal pain or blood in stool  Genitourinary: no dysuria or frequency  Musculoskeletal: no joint pains or weakness  Neurological: no weakness, numbness, or tingling    MEDICATIONS  (STANDING):  acyclovir IVPB 650 milliGRAM(s) IV Intermittent every 8 hours  enoxaparin Injectable 40 milliGRAM(s) SubCutaneous every 12 hours  lactated ringers Bolus 1000 milliLiter(s) IV Bolus once  lactated ringers. 1000 milliLiter(s) (100 mL/Hr) IV Continuous <Continuous>  naloxone Injectable 0.4 milliGRAM(s) IV Push once  polyethylene glycol 3350 17 Gram(s) Oral daily  senna 2 Tablet(s) Oral at bedtime    MEDICATIONS  (PRN):  acetaminophen     Tablet .. 650 milliGRAM(s) Oral every 6 hours PRN Temp greater or equal to 38C (100.4F), Mild Pain (1 - 3)  bisacodyl 5 milliGRAM(s) Oral daily PRN Constipation  gabapentin 100 milliGRAM(s) Oral three times a day PRN Herpetic neuralgia  melatonin 3 milliGRAM(s) Oral at bedtime PRN Insomnia  ondansetron Injectable 4 milliGRAM(s) IV Push every 8 hours PRN Nausea and/or Vomiting  oxyCODONE    IR 2.5 milliGRAM(s) Oral every 4 hours PRN Moderate Pain (4 - 6)  oxyCODONE    IR 5 milliGRAM(s) Oral every 4 hours PRN Severe Pain (7 - 10)      Allergies    No Known Allergies    Intolerances        SOCIAL HISTORY: No pertinent social history.    FAMILY HISTORY: No pertinent family history in first degree relatives    Vital Signs Last 24 Hrs  T(C): 36.5 (23 Nov 2022 07:11), Max: 36.8 (22 Nov 2022 20:55)  T(F): 97.7 (23 Nov 2022 07:11), Max: 98.3 (22 Nov 2022 23:56)  HR: 67 (23 Nov 2022 07:11) (67 - 76)  BP: 114/54 (23 Nov 2022 07:11) (110/58 - 149/71)  BP(mean): --  RR: 16 (23 Nov 2022 07:11) (16 - 18)  SpO2: 100% (23 Nov 2022 07:11) (100% - 100%)    Parameters below as of 23 Nov 2022 07:11  Patient On (Oxygen Delivery Method): room air    PHYSICAL EXAM:  The patient was AOx3, well nourished, and in NAD.  OP showed no ulcerations.  There was no visible LAD.  Conjunctiva were non-injected.  There was no clubbing or edema of extremities.  The scalp, hair, face, eyebrows, lips, OP, neck, chest, back, extremities x4, and nails were examined.  There was no hyperhidrosis or bromhidrosis.    Of note on skin exam:    LABS:                        12.6   5.76  )-----------( 259      ( 23 Nov 2022 00:44 )             38.4     11-23    141  |  106  |  19  ----------------------------<  107<H>  3.7   |  25  |  0.91    Ca    8.8      23 Nov 2022 00:44    TPro  6.8  /  Alb  4.1  /  TBili  0.2  /  DBili  x   /  AST  16  /  ALT  13  /  AlkPhos  59  11-23          RADIOLOGY & ADDITIONAL STUDIES:

## 2022-11-23 NOTE — CONSULT NOTE ADULT - ASSESSMENT
INCOMPLETE NOTE, FINAL RECS TO FOLLOW    Assessment and Recommendations:  67y female w/ no significant PMH/POH consulted for herpes zoster ophthalmicus.  # HZO eval, left side  - Failed outpatient PO Valtrex therapy  - Patient without periorbital skin lesions on either side however endorses a sharp pain in left maxillary area similar to the pain associated with her arm/chest rash  - Negative Ying's sign  - No injection OU; no dendrites/pseudodenrites seen OU  - Posterior segment exam shows ***  - Acyclovir per primary team/ID  - Findings and plan discussed with patient and primary team.    Patient seen and discussed with  ***    Outpatient follow-up: Patient should follow-up with his/her ophthalmologist or with [facility] Department of Ophthalmology at the address below     [address] [place] [address]. [address]  [place], [place] [id number] [phone]     Assessment and Recommendations:  67y female w/ no significant PMH/POH consulted for herpes zoster ophthalmicus.  # HZO eval, left side  - Failed outpatient PO Valtrex therapy  - Patient without periorbital skin lesions on either side however endorses a sharp pain in left maxillary area similar to the pain associated with her arm/chest rash  - Negative Ying's sign  - No injection OU; no dendrites/pseudodenrites seen on cornea OU  - Posterior segment exam shows no acute abnormalities  - Acyclovir per primary team/ID  - Findings and plan discussed with patient and primary team.    # Glaucoma suspect  - Asymmetric CDR 0.4 OD, 0.8 OS  - Reports history of laser in both eyes for glaucoma years ago, patient does not know details  - IOP wnl  - Outpatient follow-up for OCT ON/HVF/Pachymetry       Outpatient follow-up: Patient should follow-up with his/her ophthalmologist or with [facility] Department of Ophthalmology at the address below     [address] [place] [address]. [address]  [place], [place] [id number] [phone]     Assessment and Recommendations:  67y female w/ no significant PMH/POH consulted for herpes zoster ophthalmicus.  # HZO eval, left side  - Failed outpatient PO Valtrex therapy  - Patient without periorbital skin lesions on either side however endorses a sharp pain in left maxillary area similar to the pain associated with her arm/chest rash  - Negative Ying's sign  - No injection OU; no dendrites/pseudodenrites seen on cornea OU  - Posterior segment exam shows no acute abnormalities  - Acyclovir per primary team/ID  - Findings and plan discussed with patient and primary team.    # Glaucoma suspect  - Asymmetric CDR 0.4 OD, 0.8 OS  - Reports history of laser in both eyes for glaucoma years ago, patient does not know details  - IOP wnl  - Outpatient follow-up for OCT ON/HVF/Pachymetry     Seen & discussed with Dr John    Outpatient follow-up: Patient should follow-up with his/her ophthalmologist or with [facility] Department of Ophthalmology at the address below     [address] [place] [address]. [address]  [place] [address]  [phone]

## 2022-11-23 NOTE — PATIENT PROFILE ADULT - FALL HARM RISK - RISK INTERVENTIONS

## 2022-11-23 NOTE — H&P ADULT - PROBLEM SELECTOR PLAN 3
DVT ppx - Lovenox  Diet - Regular  Activity - Ambulate with assistance, OOB with assistance, increase as tolerated    Fall precautions

## 2022-11-23 NOTE — PROGRESS NOTE ADULT - SUBJECTIVE AND OBJECTIVE BOX
Patient is a 67y old  Female who presents with a chief complaint of Disseminated Herpes Zoster (23 Nov 2022 09:12)      SUBJECTIVE / OVERNIGHT EVENTS:    MEDICATIONS  (STANDING):  acyclovir IVPB 500 milliGRAM(s) IV Intermittent every 8 hours  enoxaparin Injectable 40 milliGRAM(s) SubCutaneous every 12 hours  naloxone Injectable 0.4 milliGRAM(s) IV Push once  polyethylene glycol 3350 17 Gram(s) Oral daily  senna 2 Tablet(s) Oral at bedtime    MEDICATIONS  (PRN):  acetaminophen     Tablet .. 650 milliGRAM(s) Oral every 6 hours PRN Temp greater or equal to 38C (100.4F), Mild Pain (1 - 3)  aluminum hydroxide/magnesium hydroxide/simethicone Suspension 30 milliLiter(s) Oral every 4 hours PRN Dyspepsia  bisacodyl 5 milliGRAM(s) Oral daily PRN Constipation  gabapentin 100 milliGRAM(s) Oral three times a day PRN Herpetic neuralgia  melatonin 3 milliGRAM(s) Oral at bedtime PRN Insomnia  ondansetron Injectable 4 milliGRAM(s) IV Push every 8 hours PRN Nausea and/or Vomiting  oxyCODONE    IR 2.5 milliGRAM(s) Oral every 4 hours PRN Moderate Pain (4 - 6)  oxyCODONE    IR 5 milliGRAM(s) Oral every 4 hours PRN Severe Pain (7 - 10)      Vital Signs Last 24 Hrs  T(C): 36.5 (23 Nov 2022 07:11), Max: 36.8 (22 Nov 2022 20:55)  T(F): 97.7 (23 Nov 2022 07:11), Max: 98.3 (22 Nov 2022 23:56)  HR: 67 (23 Nov 2022 07:11) (67 - 76)  BP: 114/54 (23 Nov 2022 07:11) (110/58 - 149/71)  BP(mean): --  RR: 16 (23 Nov 2022 07:11) (16 - 18)  SpO2: 100% (23 Nov 2022 07:11) (100% - 100%)    Parameters below as of 23 Nov 2022 07:11  Patient On (Oxygen Delivery Method): room air      CAPILLARY BLOOD GLUCOSE        I&O's Summary      PHYSICAL EXAM:  GENERAL: NAD, well-developed  HEAD:  Atraumatic, Normocephalic  EYES: EOMI, PERRLA, conjunctiva and sclera clear  NECK: Supple, No JVD  CHEST/LUNG: Clear to auscultation bilaterally; No wheeze  HEART: Regular rate and rhythm; No murmurs, rubs, or gallops  ABDOMEN: Soft, Nontender, Nondistended; Bowel sounds present  EXTREMITIES:  2+ Peripheral Pulses, No clubbing, cyanosis, or edema  PSYCH: AAOx3  NEUROLOGY: non-focal  SKIN: No rashes or lesions    LABS:                        12.6   5.76  )-----------( 259      ( 23 Nov 2022 00:44 )             38.4     11-23    141  |  106  |  19  ----------------------------<  107<H>  3.7   |  25  |  0.91    Ca    8.8      23 Nov 2022 00:44    TPro  6.8  /  Alb  4.1  /  TBili  0.2  /  DBili  x   /  AST  16  /  ALT  13  /  AlkPhos  59  11-23              RADIOLOGY & ADDITIONAL TESTS:    Imaging Personally Reviewed:    Consultant(s) Notes Reviewed:      Care Discussed with Consultants/Other Providers:   Patient is a 67y old  Female who presents with a chief complaint of Disseminated Herpes Zoster (23 Nov 2022 09:12)      SUBJECTIVE / OVERNIGHT EVENTS:  Patient c/o eye irritation and mild headache. Denies cp, SOB, abdominal pain, N/V/D     MEDICATIONS  (STANDING):  acyclovir IVPB 500 milliGRAM(s) IV Intermittent every 8 hours  enoxaparin Injectable 40 milliGRAM(s) SubCutaneous every 12 hours  naloxone Injectable 0.4 milliGRAM(s) IV Push once  polyethylene glycol 3350 17 Gram(s) Oral daily  senna 2 Tablet(s) Oral at bedtime    MEDICATIONS  (PRN):  acetaminophen     Tablet .. 650 milliGRAM(s) Oral every 6 hours PRN Temp greater or equal to 38C (100.4F), Mild Pain (1 - 3)  aluminum hydroxide/magnesium hydroxide/simethicone Suspension 30 milliLiter(s) Oral every 4 hours PRN Dyspepsia  bisacodyl 5 milliGRAM(s) Oral daily PRN Constipation  gabapentin 100 milliGRAM(s) Oral three times a day PRN Herpetic neuralgia  melatonin 3 milliGRAM(s) Oral at bedtime PRN Insomnia  ondansetron Injectable 4 milliGRAM(s) IV Push every 8 hours PRN Nausea and/or Vomiting  oxyCODONE    IR 2.5 milliGRAM(s) Oral every 4 hours PRN Moderate Pain (4 - 6)  oxyCODONE    IR 5 milliGRAM(s) Oral every 4 hours PRN Severe Pain (7 - 10)      Vital Signs Last 24 Hrs  T(C): 36.5 (23 Nov 2022 07:11), Max: 36.8 (22 Nov 2022 20:55)  T(F): 97.7 (23 Nov 2022 07:11), Max: 98.3 (22 Nov 2022 23:56)  HR: 67 (23 Nov 2022 07:11) (67 - 76)  BP: 114/54 (23 Nov 2022 07:11) (110/58 - 149/71)  BP(mean): --  RR: 16 (23 Nov 2022 07:11) (16 - 18)  SpO2: 100% (23 Nov 2022 07:11) (100% - 100%)    Parameters below as of 23 Nov 2022 07:11  Patient On (Oxygen Delivery Method): room air      CAPILLARY BLOOD GLUCOSE        I&O's Summary      PHYSICAL EXAM:  GENERAL: NAD, well-developed  HEAD:  Atraumatic, Normocephalic  EYES: positive lesion on right side of bridge of nose. EOMI, PERRLA, conjunctiva and sclera clear  NECK: Supple, No JVD  CHEST/LUNG: Clear to auscultation bilaterally; No wheeze  HEART: Regular rate and rhythm; No murmurs, rubs, or gallops  ABDOMEN: Soft, Nontender, Nondistended; Bowel sounds present  EXTREMITIES:  2+ Peripheral Pulses, No clubbing, cyanosis, or edema  PSYCH: AAOx3  NEUROLOGY: non-focal  SKIN:  +blistering rash on L shoulder/chest, +scabbed rash on RUE, 1 blister on L upper cheek, 1 scabbed blister on R lateral ankle and R lower calf, No blisters noted on back or abdomen, likely folliculitis on R post thigh, no open wounds    LABS:                        12.6   5.76  )-----------( 259      ( 23 Nov 2022 00:44 )             38.4     11-23    141  |  106  |  19  ----------------------------<  107<H>  3.7   |  25  |  0.91    Ca    8.8      23 Nov 2022 00:44    TPro  6.8  /  Alb  4.1  /  TBili  0.2  /  DBili  x   /  AST  16  /  ALT  13  /  AlkPhos  59  11-23              RADIOLOGY & ADDITIONAL TESTS:    Imaging Personally Reviewed:    Consultant(s) Notes Reviewed:      Care Discussed with Consultants/Other Providers:

## 2022-11-23 NOTE — H&P ADULT - NSHPLABSRESULTS_GEN_ALL_CORE
LABS and ADDITIONAL STUDIES:                        12.6   5.76  )-----------( 259      ( 23 Nov 2022 00:44 )             38.4     11-23    141  |  106  |  19  ----------------------------<  107<H>  3.7   |  25  |  0.91    Ca    8.8      23 Nov 2022 00:44    TPro  6.8  /  Alb  4.1  /  TBili  0.2  /  DBili  x   /  AST  16  /  ALT  13  /  AlkPhos  59  11-23    LIVER FUNCTIONS - ( 23 Nov 2022 00:44 )  Alb: 4.1 g/dL / Pro: 6.8 g/dL / ALK PHOS: 59 U/L / ALT: 13 U/L / AST: 16 U/L / GGT: x           HIV-1/2 Antigen/Antibody Screen by [facility] (11.23.22 @ 00:44)   HIV-1/2 Combo Result: Nonreact    Sedimentation Rate, Erythrocyte (11.23.22 @ 00:44)   Sedimentation Rate, Erythrocyte: 10 mm/hr     C-Reactive Protein, Serum (11.23.22 @ 00:44)   C-Reactive Protein, Serum: <3.0 mg/L

## 2022-11-23 NOTE — CONSULT NOTE ADULT - ASSESSMENT
ASSESSMENT/PLAN:      Recommendations were communicated with the primary team (Edouard Benavides)  The patient's chart was reviewed in addition to being examined at bedside with the dermatology attending. Discussed plans with the dermatology attending, Dr. Smith  Dermatology will continue to follow. Thank you for consulting our service.    Maddy Hendrickson MD MPH  Resident Physician, PGY3  [facility]  Office: [phone]  Pager: [phone]  **Please page with [phone] callback # for further related questions.**

## 2022-11-23 NOTE — CONSULT NOTE ADULT - ASSESSMENT
66 y/o F no significant PMHx presented with rash on L chest/shoulder and R forearm x 1 week. Admitted for suspected disseminated herpes zoster. S/p 7 day course of Valtrex as outpatient. ID consulted for further management.    Afebrile, HD stable  68 y/o F no significant PMHx presented with rash on L chest/shoulder and R forearm x 1 week. Admitted for suspected disseminated herpes zoster. S/p 7 day course of Valtrex as outpatient. ID consulted for further management.    Afebrile, HD stable   WBC 6K today  Cr 0.9   HIV negative    Impression:  #Disseminated Herpes Zoster  Vesicular rash in multiple dermatomes, consistent with zoster  Not immunocompromised  No evidence of ocular involvement    Recs:  -  68 y/o F no significant PMHx presented with rash on L chest/shoulder and R forearm x 1 week. Admitted for suspected disseminated herpes zoster. S/p 7 day course of Valtrex as outpatient. ID consulted for further management.    Afebrile, HD stable   WBC 6K today  Cr 0.9   HIV negative    Impression:  #Disseminated Herpes Zoster  Vesicular rash in multiple dermatomes, consistent with zoster  Not immunocompromised  No evidence of ocular involvement    Recs:  - increase acyclovir to 650mg IV q8H based on adjusted body weight  - IVFs with acyclovir  - monitor Cr  - airborne precautions    Plan discussed with ACP    Arya Turner MD  Infectious Disease Fellow  Available on Microsoft Teams  Before 9AM or after 5PM: [phone]

## 2022-11-23 NOTE — PROGRESS NOTE ADULT - PROBLEM SELECTOR PLAN 3
DVT ppx - Lovenox  Diet - Regular  Activity - Ambulate with assistance, OOB with assistance, increase as tolerated    Fall precautions Hospital Bundle    Fluids: LR when getting acyclovir  Electrolytes: Replete K > 4, Mg > 2, Phos > 3  Nutrition: Diet Regular, encouraged hydration  PPX  ---VTE: Lovenox subq  ---GI: n/a PO diet  ---Resp: n/a 100% on RA  Access: PIV  Code Status: FULL CODE  Dispo: Home upon clinical improvement 1 episode of urinary incontinence on arrival to the ED room, patient unsure if it occurred due to her being nervous due to being in the ED or due to pain or other cause, denies any back pain, no saddle anesthesia, no incontinence since, no bowel issues, no leg numbness/weakness  - Unclear on cause of incontinence, possibly stress vs urge incontinence, doubt neurological causes as patient neurologically intact and no red flag symptoms  - Would monitor for now, pt denied any further episodes

## 2022-11-23 NOTE — H&P ADULT - HISTORY OF PRESENT ILLNESS
This is a 67F with no PMH who presents to the hospital with complaints of a painful blistering rash since last week. Said that the rash first started on her L chest/shoulder area last week around Sunday, went to [facility] on Monday and was told that she had shingles and was given valtrex for 7 days. Took the valtrex but still continued to have blisters in different parts of her body and therefore came to the hospital for further evaluation. Currently states that she still has rashes This is a 67F with no PMH who presents to the hospital with complaints of a painful blistering rash since last week. Said that the rash first started on her L chest/shoulder area last week around Sunday, went to [facility] on Monday and was told that she had shingles and was given valtrex for 7 days. Took the valtrex but still continued to have blisters in different parts of her body and therefore came to the hospital for further evaluation. Currently states that she still has rashes on different areas and still getting new rashes. Has pain with her rash, describes it as a "thousand needles pricking her skin". Took tylenol for 3 days, 1-2 times a day, without significant improvement in her pain. Recently started to have pain around her L paranasal area and noted a new blister there also, no eye pain/vision changes though. Has a mild HA but no neurological complaints. Had 1 episode of urinary incontinence earlier while getting herself situated in the ED but denied any associated back pain, saddle anesthesia, bowel incontinence, urinary incontinence at baseline, or leg weakness/numbness. No urinary complaints. No other acute complaints.     On arrival to the ED, her vitals were T 98.2, P 70, /71, RR 18, O2 sat 100% RA. Her lab work did not show acute abnormalities. Her HIV test for negative. She was given acyclovir 700mg IVPB x1. She was admitted to medicine for further management.

## 2022-11-23 NOTE — PROGRESS NOTE ADULT - SUBJECTIVE AND OBJECTIVE BOX
***************************************************************  Edouard Benavides, PGY1  Internal Medicine   pager:  [facility]: [phone] [facility]: [phone]  ***************************************************************    SETH MENDOZA  67y  MRN: [id number]    Patient is a 67y old  Female who presents with a chief complaint of Disseminated Herpes Zoster (23 Nov 2022 13:26)      Interval/Overnight Events: no events ON.     Subjective: Pt seen and examined at bedside. Denies fever, CP, SOB, abn pain, N/V, dysuria. Tolerating diet.      MEDICATIONS  (STANDING):  acyclovir IVPB 650 milliGRAM(s) IV Intermittent every 8 hours  enoxaparin Injectable 40 milliGRAM(s) SubCutaneous every 12 hours  lactated ringers Bolus 1000 milliLiter(s) IV Bolus once  lactated ringers. 1000 milliLiter(s) (100 mL/Hr) IV Continuous <Continuous>  naloxone Injectable 0.4 milliGRAM(s) IV Push once  polyethylene glycol 3350 17 Gram(s) Oral daily  senna 2 Tablet(s) Oral at bedtime    MEDICATIONS  (PRN):  acetaminophen     Tablet .. 650 milliGRAM(s) Oral every 6 hours PRN Temp greater or equal to 38C (100.4F), Mild Pain (1 - 3)  bisacodyl 5 milliGRAM(s) Oral daily PRN Constipation  gabapentin 100 milliGRAM(s) Oral three times a day PRN Herpetic neuralgia  melatonin 3 milliGRAM(s) Oral at bedtime PRN Insomnia  ondansetron Injectable 4 milliGRAM(s) IV Push every 8 hours PRN Nausea and/or Vomiting  oxyCODONE    IR 2.5 milliGRAM(s) Oral every 4 hours PRN Moderate Pain (4 - 6)  oxyCODONE    IR 5 milliGRAM(s) Oral every 4 hours PRN Severe Pain (7 - 10)      Objective:    Vitals: Vital Signs Last 24 Hrs  T(C): 36.6 (11-23-22 @ 13:29), Max: 36.8 (11-22-22 @ 20:55)  T(F): 97.8 (11-23-22 @ 13:29), Max: 98.3 (11-22-22 @ 23:56)  HR: 59 (11-23-22 @ 13:29) (59 - 76)  BP: 126/52 (11-23-22 @ 13:29) (110/58 - 149/71)  BP(mean): --  RR: 17 (11-23-22 @ 13:29) (16 - 18)  SpO2: 100% (11-23-22 @ 13:29) (100% - 100%)                I&O's Summary      PHYSICAL EXAM:  GENERAL: NAD  HEAD:  Atraumatic, Normocephalic  EYES: EOMI, conjunctiva and sclera clear  CHEST/LUNG: Clear to auscultation bilaterally; No rales, rhonchi, wheezing, or rubs  HEART: Regular rate and rhythm; No murmurs, rubs, or gallops  ABDOMEN: Soft, Nontender, Nondistended;   SKIN: No rashes or lesions  NERVOUS SYSTEM:  Alert & Oriented X3, no focal deficits    LABS:  11-23    141  |  106  |  19  ----------------------------<  107<H>  3.7   |  25  |  0.91    Ca    8.8      23 Nov 2022 00:44    TPro  6.8  /  Alb  4.1  /  TBili  0.2  /  DBili  x   /  AST  16  /  ALT  13  /  AlkPhos  59  11-23                                              12.6   5.76  )-----------( 259      ( 23 Nov 2022 00:44 )             38.4     CAPILLARY BLOOD GLUCOSE          RADIOLOGY & ADDITIONAL TESTS:    Imaging Personally Reviewed:  [x ] YES  [ ] NO    Consultants involved in case:   Consultant(s) Notes Reviewed:  [ x] YES  [ ] NO:   Care Discussed with Consultants/Other Providers [x ] YES  [ ] NO         ***************************************************************  Edouard Benavides, PGY1  Internal Medicine   pager:  [facility]: [phone] [facility]: [phone]  ***************************************************************    SETH MENDOZA  67y  MRN: [id number]    Patient is a 67y old  Female who presents with a chief complaint of Disseminated Herpes Zoster (23 Nov 2022 13:26)    Interval/Overnight Events: no events ON  - Ophthalmology ID consulted; recs appreciated    Subjective: Pt seen and examined at bedside. Denies fever, CP, SOB, abn pain, N/V, dysuria. Tolerating diet. Spoke w/ pt's employer Dr. Beronica Garcia, who is also PCP. She was the one who originally treated the patient for Shingles. Denies any PMH asides from h/o knee surgery, breast biopsy (negative), and s/p cholecystectomy. Pt not on any medications asides from prescribed valtrex and tylenol from earlier. Had chickenpox as a child, received 1 dose of Shingrex this year. Recently had Covid in late Sept/early Oct. Dr. Garcia was originally concerned for possible Rickettsial infection as facial lesions and R leg lesion did not appear classically for shingles. But no current evidence supporting such. Pt denied any recent fever, chills, sick contacts, has not travelled outside of the neighborhood since summertime when she went [facility], but denied any hiking or outdoor activities this year. Given Rickettsia typically presents within 14 days of exposure, highly unlikely to be cause.     Pt states symptoms have improved dramatically since initiating treatment, still notes "pin and needle" sensation of R leg lesion.    MEDICATIONS  (STANDING):  acyclovir IVPB 650 milliGRAM(s) IV Intermittent every 8 hours  enoxaparin Injectable 40 milliGRAM(s) SubCutaneous every 12 hours  lactated ringers Bolus 1000 milliLiter(s) IV Bolus once  lactated ringers. 1000 milliLiter(s) (100 mL/Hr) IV Continuous <Continuous>  naloxone Injectable 0.4 milliGRAM(s) IV Push once  polyethylene glycol 3350 17 Gram(s) Oral daily  senna 2 Tablet(s) Oral at bedtime    MEDICATIONS  (PRN):  acetaminophen     Tablet .. 650 milliGRAM(s) Oral every 6 hours PRN Temp greater or equal to 38C (100.4F), Mild Pain (1 - 3)  bisacodyl 5 milliGRAM(s) Oral daily PRN Constipation  gabapentin 100 milliGRAM(s) Oral three times a day PRN Herpetic neuralgia  melatonin 3 milliGRAM(s) Oral at bedtime PRN Insomnia  ondansetron Injectable 4 milliGRAM(s) IV Push every 8 hours PRN Nausea and/or Vomiting  oxyCODONE    IR 2.5 milliGRAM(s) Oral every 4 hours PRN Moderate Pain (4 - 6)  oxyCODONE    IR 5 milliGRAM(s) Oral every 4 hours PRN Severe Pain (7 - 10)    Objective:    Vitals: Vital Signs Last 24 Hrs  T(C): 36.6 (11-23-22 @ 13:29), Max: 36.8 (11-22-22 @ 20:55)  T(F): 97.8 (11-23-22 @ 13:29), Max: 98.3 (11-22-22 @ 23:56)  HR: 59 (11-23-22 @ 13:29) (59 - 76)  BP: 126/52 (11-23-22 @ 13:29) (110/58 - 149/71)  BP(mean): --  RR: 17 (11-23-22 @ 13:29) (16 - 18)  SpO2: 100% (11-23-22 @ 13:29) (100% - 100%)                I&O's Summary    PHYSICAL EXAM:  GENERAL: NAD, resting comfortably in bed.  HEAD:  Atraumatic, Normocephalic  EYES: EOMI, conjunctiva and sclera clear  CHEST/LUNG: Clear to auscultation bilaterally; No rales, rhonchi, wheezing, or rubs  HEART: Regular rate and rhythm; No murmurs, rubs, or gallops  ABDOMEN: Soft, Nontender, Nondistended;   SKIN: Mild erythema L nose w/ lesion in nose, diffuse erythema noted L upper chest/shoulder, Boil/folliculitis R posterior thigh, scabbed lesion on R ankle. No lesions TTP asides from R posterior thigh.  NERVOUS SYSTEM:  Alert & Oriented X3, no focal deficits    LABS:  11-23    141  |  106  |  19  ----------------------------<  107<H>  3.7   |  25  |  0.91    Ca    8.8      23 Nov 2022 00:44    TPro  6.8  /  Alb  4.1  /  TBili  0.2  /  DBili  x   /  AST  16  /  ALT  13  /  AlkPhos  59  11-23                        12.6   5.76  )-----------( 259      ( 23 Nov 2022 00:44 )             38.4     CAPILLARY BLOOD GLUCOSE    RADIOLOGY & ADDITIONAL TESTS:    Imaging Personally Reviewed:  [x ] YES  [ ] NO    Consultants involved in case:   Consultant(s) Notes Reviewed:  [ x] YES  [ ] NO:   Care Discussed with Consultants/Other Providers [x ] YES  [ ] NO         ***************************************************************  Edouard Benavides, PGY1  Internal Medicine   pager:  [facility]: [phone] [facility]: [phone]  ***************************************************************    SETH MENDOZA  67y  MRN: [id number]    Patient is a 67y old  Female who presents with a chief complaint of Disseminated Herpes Zoster (23 Nov 2022 13:26)    Interval/Overnight Events: no events ON  - Ophthalmology ID consulted; recs appreciated    Subjective: Pt seen and examined at bedside. Denies fever, CP, SOB, abn pain, N/V, dysuria. Tolerating diet. Spoke w/ pt's employer Dr. Beronica Garcia, who is also PCP. She was the one who originally treated the patient for Shingles. Denies any PMH asides from h/o knee surgery, breast biopsy (negative), and s/p cholecystectomy. Pt not on any medications asides from prescribed valtrex and tylenol from earlier. Had chickenpox as a child, received 1 dose of Shingrex this year. Recently had Covid in late Sept/early Oct. Dr. Garcia was originally concerned for possible Rickettsial infection as facial lesions and R leg lesion did not appear classically for shingles. But no current evidence supporting such. Pt denied any recent fever, chills, sick contacts, has not travelled outside of the neighborhood since summertime when she went [facility], but denied any hiking or outdoor activities this year. Given Rickettsia typically presents within 14 days of exposure, highly unlikely to be cause.     Pt states symptoms have improved dramatically since initiating treatment, still notes "pin and needle" sensation of R leg lesion.    MEDICATIONS  (STANDING):  acyclovir IVPB 650 milliGRAM(s) IV Intermittent every 8 hours  enoxaparin Injectable 40 milliGRAM(s) SubCutaneous every 12 hours  lactated ringers Bolus 1000 milliLiter(s) IV Bolus once  lactated ringers. 1000 milliLiter(s) (100 mL/Hr) IV Continuous <Continuous>  naloxone Injectable 0.4 milliGRAM(s) IV Push once  polyethylene glycol 3350 17 Gram(s) Oral daily  senna 2 Tablet(s) Oral at bedtime    MEDICATIONS  (PRN):  acetaminophen     Tablet .. 650 milliGRAM(s) Oral every 6 hours PRN Temp greater or equal to 38C (100.4F), Mild Pain (1 - 3)  bisacodyl 5 milliGRAM(s) Oral daily PRN Constipation  gabapentin 100 milliGRAM(s) Oral three times a day PRN Herpetic neuralgia  melatonin 3 milliGRAM(s) Oral at bedtime PRN Insomnia  ondansetron Injectable 4 milliGRAM(s) IV Push every 8 hours PRN Nausea and/or Vomiting  oxyCODONE    IR 2.5 milliGRAM(s) Oral every 4 hours PRN Moderate Pain (4 - 6)  oxyCODONE    IR 5 milliGRAM(s) Oral every 4 hours PRN Severe Pain (7 - 10)    Objective:    Vitals: Vital Signs Last 24 Hrs  T(C): 36.6 (11-23-22 @ 13:29), Max: 36.8 (11-22-22 @ 20:55)  T(F): 97.8 (11-23-22 @ 13:29), Max: 98.3 (11-22-22 @ 23:56)  HR: 59 (11-23-22 @ 13:29) (59 - 76)  BP: 126/52 (11-23-22 @ 13:29) (110/58 - 149/71)  BP(mean): --  RR: 17 (11-23-22 @ 13:29) (16 - 18)  SpO2: 100% (11-23-22 @ 13:29) (100% - 100%)                I&O's Summary    PHYSICAL EXAM:  GENERAL: NAD, resting comfortably in bed.  HEAD:  Atraumatic, Normocephalic  EYES: EOMI, conjunctiva and sclera clear  CHEST/LUNG: Clear to auscultation bilaterally; No rales, rhonchi, wheezing, or rubs  HEART: Regular rate and rhythm; No murmurs, rubs, or gallops  ABDOMEN: Soft, Nontender, Nondistended;   SKIN: Mild erythema L nose w/ lesion in nose, diffuse erythema noted L upper chest/shoulder, Boil/folliculitis R posterior thigh ~1x1cm, scabbed lesion on R ankle. No lesions TTP asides from R posterior thigh.  NERVOUS SYSTEM:  Alert & Oriented X3, no focal deficits    LABS:  11-23    141  |  106  |  19  ----------------------------<  107<H>  3.7   |  25  |  0.91    Ca    8.8      23 Nov 2022 00:44    TPro  6.8  /  Alb  4.1  /  TBili  0.2  /  DBili  x   /  AST  16  /  ALT  13  /  AlkPhos  59  11-23                        12.6   5.76  )-----------( 259      ( 23 Nov 2022 00:44 )             38.4     CAPILLARY BLOOD GLUCOSE    RADIOLOGY & ADDITIONAL TESTS:    Imaging Personally Reviewed:  [x ] YES  [ ] NO    Consultants involved in case:   Consultant(s) Notes Reviewed:  [ x] YES  [ ] NO:   Care Discussed with Consultants/Other Providers [x ] YES  [ ] NO

## 2022-11-23 NOTE — H&P ADULT - PROBLEM SELECTOR PLAN 2
- 1 episode of urinary incontinence on arrival to the ED room, patient unsure if it occurred due to her being nervous due to being in the ED or due to pain or other cause, denies any back pain, no saddle anesthesia, no incontinence since, no bowel issues, no leg numbness/weakness  - Unclear on cause of incontinence, possibly stress vs urge incontinence, doubt neurological causes as patient neurologically intact and no red flag symptoms  - Would monitor for now

## 2022-11-24 LAB
ALBUMIN SERPL ELPH-MCNC: 4.2 G/DL — SIGNIFICANT CHANGE UP (ref 3.3–5)
ALP SERPL-CCNC: 57 U/L — SIGNIFICANT CHANGE UP (ref 40–120)
ALT FLD-CCNC: 16 U/L — SIGNIFICANT CHANGE UP (ref 4–33)
ANION GAP SERPL CALC-SCNC: 10 MMOL/L — SIGNIFICANT CHANGE UP (ref 7–14)
AST SERPL-CCNC: 17 U/L — SIGNIFICANT CHANGE UP (ref 4–32)
BILIRUB SERPL-MCNC: 0.4 MG/DL — SIGNIFICANT CHANGE UP (ref 0.2–1.2)
BUN SERPL-MCNC: 11 MG/DL — SIGNIFICANT CHANGE UP (ref 7–23)
CALCIUM SERPL-MCNC: 9.2 MG/DL — SIGNIFICANT CHANGE UP (ref 8.4–10.5)
CHLORIDE SERPL-SCNC: 101 MMOL/L — SIGNIFICANT CHANGE UP (ref 98–107)
CO2 SERPL-SCNC: 24 MMOL/L — SIGNIFICANT CHANGE UP (ref 22–31)
CREAT SERPL-MCNC: 0.82 MG/DL — SIGNIFICANT CHANGE UP (ref 0.5–1.3)
EGFR: 78 ML/MIN/1.73M2 — SIGNIFICANT CHANGE UP
GLUCOSE SERPL-MCNC: 143 MG/DL — HIGH (ref 70–99)
HCT VFR BLD CALC: 42.6 % — SIGNIFICANT CHANGE UP (ref 34.5–45)
HCV AB S/CO SERPL IA: 0.06 S/CO — SIGNIFICANT CHANGE UP (ref 0–0.99)
HCV AB SERPL-IMP: SIGNIFICANT CHANGE UP
HGB BLD-MCNC: 13.9 G/DL — SIGNIFICANT CHANGE UP (ref 11.5–15.5)
MAGNESIUM SERPL-MCNC: 2.2 MG/DL — SIGNIFICANT CHANGE UP (ref 1.6–2.6)
MCHC RBC-ENTMCNC: 28.8 PG — SIGNIFICANT CHANGE UP (ref 27–34)
MCHC RBC-ENTMCNC: 32.6 GM/DL — SIGNIFICANT CHANGE UP (ref 32–36)
MCV RBC AUTO: 88.4 FL — SIGNIFICANT CHANGE UP (ref 80–100)
NRBC # BLD: 0 /100 WBCS — SIGNIFICANT CHANGE UP (ref 0–0)
NRBC # FLD: 0 K/UL — SIGNIFICANT CHANGE UP (ref 0–0)
PHOSPHATE SERPL-MCNC: 3.4 MG/DL — SIGNIFICANT CHANGE UP (ref 2.5–4.5)
PLATELET # BLD AUTO: 275 K/UL — SIGNIFICANT CHANGE UP (ref 150–400)
POTASSIUM SERPL-MCNC: 3.8 MMOL/L — SIGNIFICANT CHANGE UP (ref 3.5–5.3)
POTASSIUM SERPL-SCNC: 3.8 MMOL/L — SIGNIFICANT CHANGE UP (ref 3.5–5.3)
PROT SERPL-MCNC: 7 G/DL — SIGNIFICANT CHANGE UP (ref 6–8.3)
RBC # BLD: 4.82 M/UL — SIGNIFICANT CHANGE UP (ref 3.8–5.2)
RBC # FLD: 13.3 % — SIGNIFICANT CHANGE UP (ref 10.3–14.5)
SODIUM SERPL-SCNC: 135 MMOL/L — SIGNIFICANT CHANGE UP (ref 135–145)
WBC # BLD: 4.6 K/UL — SIGNIFICANT CHANGE UP (ref 3.8–10.5)
WBC # FLD AUTO: 4.6 K/UL — SIGNIFICANT CHANGE UP (ref 3.8–10.5)

## 2022-11-24 PROCEDURE — 99233 SBSQ HOSP IP/OBS HIGH 50: CPT | Mod: GC

## 2022-11-24 RX ORDER — GABAPENTIN 400 MG/1
100 CAPSULE ORAL THREE TIMES A DAY
Refills: 0 | Status: DISCONTINUED | OUTPATIENT
Start: 2022-11-24 | End: 2022-11-25

## 2022-11-24 RX ADMIN — Medication 263 MILLIGRAM(S): at 06:31

## 2022-11-24 RX ADMIN — Medication 263 MILLIGRAM(S): at 22:01

## 2022-11-24 RX ADMIN — ENOXAPARIN SODIUM 40 MILLIGRAM(S): 100 INJECTION SUBCUTANEOUS at 06:10

## 2022-11-24 RX ADMIN — GABAPENTIN 100 MILLIGRAM(S): 400 CAPSULE ORAL at 13:37

## 2022-11-24 RX ADMIN — Medication 263 MILLIGRAM(S): at 13:23

## 2022-11-24 RX ADMIN — GABAPENTIN 100 MILLIGRAM(S): 400 CAPSULE ORAL at 22:01

## 2022-11-24 NOTE — PROGRESS NOTE ADULT - PROBLEM SELECTOR PLAN 3
1 episode of urinary incontinence on arrival to the ED room, patient unsure if it occurred due to her being nervous due to being in the ED or due to pain or other cause, denies any back pain, no saddle anesthesia, no incontinence since, no bowel issues, no leg numbness/weakness  - Unclear on cause of incontinence, possibly stress vs urge incontinence, doubt neurological causes as patient neurologically intact and no red flag symptoms  - Would monitor for now, pt denied any further episodes 1 episode of urinary incontinence on arrival to the ED room, patient unsure if it occurred due to her being nervous due to being in the ED or due to pain or other cause, denies any back pain, no saddle anesthesia, no incontinence since, no bowel issues, no leg numbness/weakness. No further episodes.  - Unclear on cause of incontinence, possibly stress vs urge incontinence, doubt neurological causes as patient neurologically intact and no red flag symptoms  - Would monitor for now, pt denied any further episodes

## 2022-11-24 NOTE — PROGRESS NOTE ADULT - PROBLEM SELECTOR PLAN 1
Patient with blistering rash first appearing on Sunday, some areas scabbed over, some still blisters, having new areas of involvement despite treatment with PO valtrex.  - c/w acyclovir IV  - IVF while getting acyclovir to prevent CARA  - Gabapentin prn for neuropathic pain, oxy IR prn for pain   -ID consulted for disseminated zoster, agreed w/ acyclovir  -Dermatology consult cancelled  -Consulted ophthalmology to r/o zoster ophthalmicus; negative Ying sign, unlikely involvement  -monitor for new lesions Patient with blistering rash first appearing on Sunday, some areas scabbed over, some still blisters, having new areas of involvement despite treatment with PO valtrex.  - c/w acyclovir IV  - IVF while getting acyclovir to prevent CARA  - Gabapentin prn for neuropathic pain, oxy IR prn for pain   - ID consulted for disseminated zoster, c/w acyclovir --> clarify length of course given prior PO valtrex.  - Dermatology consult cancelled  - Consulted ophthalmology, recs appreciated. r/o zoster ophthalmicus; negative Ying sign, unlikely involvement  - monitor for new lesions

## 2022-11-24 NOTE — PROGRESS NOTE ADULT - PROBLEM SELECTOR PLAN 2
likely secondary bacterial infection of zoster lesion, mildly TTP, ~1x1cm on exam. Erythema surrounding skin, no warmth.  - ctm for need of i&d, abx

## 2022-11-24 NOTE — PROGRESS NOTE ADULT - SUBJECTIVE AND OBJECTIVE BOX
***************************************************************  Edouard Benavides, PGY1  Internal Medicine   pager:  [facility]: [phone] [facility]: [phone]  ***************************************************************    SETH MENDOZA  67y  MRN: [id number]    Patient is a 67y old  Female who presents with a chief complaint of Disseminated Herpes Zoster (23 Nov 2022 13:59)    Interval/Overnight Events: no events ON.     Subjective: Pt seen and examined at bedside. Denies fever, CP, SOB, abn pain, N/V, dysuria. Tolerating diet.      MEDICATIONS  (STANDING):  acyclovir IVPB 650 milliGRAM(s) IV Intermittent every 8 hours  enoxaparin Injectable 40 milliGRAM(s) SubCutaneous every 12 hours  lactated ringers. 1000 milliLiter(s) (100 mL/Hr) IV Continuous <Continuous>  polyethylene glycol 3350 17 Gram(s) Oral daily  senna 2 Tablet(s) Oral at bedtime    MEDICATIONS  (PRN):  acetaminophen     Tablet .. 650 milliGRAM(s) Oral every 6 hours PRN Temp greater or equal to 38C (100.4F), Mild Pain (1 - 3)  bisacodyl 5 milliGRAM(s) Oral daily PRN Constipation  gabapentin 100 milliGRAM(s) Oral three times a day PRN Herpetic neuralgia  melatonin 3 milliGRAM(s) Oral at bedtime PRN Insomnia  ondansetron Injectable 4 milliGRAM(s) IV Push every 8 hours PRN Nausea and/or Vomiting      Objective:    Vitals: Vital Signs Last 24 Hrs  T(C): 36.6 (11-24-22 @ 06:25), Max: 36.8 (11-23-22 @ 21:30)  T(F): 97.9 (11-24-22 @ 06:25), Max: 98.2 (11-23-22 @ 21:30)  HR: 61 (11-24-22 @ 06:25) (59 - 66)  BP: 119/67 (11-24-22 @ 06:25) (119/67 - 127/63)  BP(mean): --  RR: 17 (11-24-22 @ 06:25) (17 - 17)  SpO2: 99% (11-24-22 @ 06:25) (98% - 100%)                I&O's Summary    PHYSICAL EXAM:  GENERAL: NAD, resting comfortably in bed.  HEAD:  Atraumatic, Normocephalic  EYES: EOMI, conjunctiva and sclera clear  CHEST/LUNG: Clear to auscultation bilaterally; No rales, rhonchi, wheezing, or rubs  HEART: Regular rate and rhythm; No murmurs, rubs, or gallops  ABDOMEN: Soft, Nontender, Nondistended;   SKIN: Mild erythema L nose w/ lesion in nose, diffuse erythema noted L upper chest/shoulder, Boil/folliculitis R posterior thigh ~1x1cm, scabbed lesion on R ankle. No lesions TTP asides from R posterior thigh.  NERVOUS SYSTEM:  Alert & Oriented X3, no focal deficits    LABS:  11-23    141  |  106  |  19  ----------------------------<  107<H>  3.7   |  25  |  0.91    Ca    8.8      23 Nov 2022 00:44    TPro  6.8  /  Alb  4.1  /  TBili  0.2  /  DBili  x   /  AST  16  /  ALT  13  /  AlkPhos  59  11-23                 13.9   4.60  )-----------( 275      ( 24 Nov 2022 07:16 )             42.6                         12.6   5.76  )-----------( 259      ( 23 Nov 2022 00:44 )             38.4     CAPILLARY BLOOD GLUCOSE    RADIOLOGY & ADDITIONAL TESTS:    Imaging Personally Reviewed:  [x ] YES  [ ] NO    Consultants involved in case:   Consultant(s) Notes Reviewed:  [ x] YES  [ ] NO:   Care Discussed with Consultants/Other Providers [x ] YES  [ ] NO

## 2022-11-25 ENCOUNTER — TRANSCRIPTION ENCOUNTER (OUTPATIENT)
Age: 67
End: 2022-11-25

## 2022-11-25 VITALS
OXYGEN SATURATION: 97 % | SYSTOLIC BLOOD PRESSURE: 112 MMHG | DIASTOLIC BLOOD PRESSURE: 54 MMHG | HEART RATE: 76 BPM | TEMPERATURE: 98 F | RESPIRATION RATE: 18 BRPM

## 2022-11-25 LAB
ALBUMIN SERPL ELPH-MCNC: 3.6 G/DL — SIGNIFICANT CHANGE UP (ref 3.3–5)
ALP SERPL-CCNC: 53 U/L — SIGNIFICANT CHANGE UP (ref 40–120)
ALT FLD-CCNC: 12 U/L — SIGNIFICANT CHANGE UP (ref 4–33)
ANION GAP SERPL CALC-SCNC: 9 MMOL/L — SIGNIFICANT CHANGE UP (ref 7–14)
AST SERPL-CCNC: 12 U/L — SIGNIFICANT CHANGE UP (ref 4–32)
BILIRUB SERPL-MCNC: <0.2 MG/DL — SIGNIFICANT CHANGE UP (ref 0.2–1.2)
BUN SERPL-MCNC: 11 MG/DL — SIGNIFICANT CHANGE UP (ref 7–23)
CALCIUM SERPL-MCNC: 8.5 MG/DL — SIGNIFICANT CHANGE UP (ref 8.4–10.5)
CHLORIDE SERPL-SCNC: 104 MMOL/L — SIGNIFICANT CHANGE UP (ref 98–107)
CO2 SERPL-SCNC: 25 MMOL/L — SIGNIFICANT CHANGE UP (ref 22–31)
CREAT SERPL-MCNC: 0.81 MG/DL — SIGNIFICANT CHANGE UP (ref 0.5–1.3)
EGFR: 79 ML/MIN/1.73M2 — SIGNIFICANT CHANGE UP
GLUCOSE SERPL-MCNC: 140 MG/DL — HIGH (ref 70–99)
HCT VFR BLD CALC: 37.2 % — SIGNIFICANT CHANGE UP (ref 34.5–45)
HGB BLD-MCNC: 12.3 G/DL — SIGNIFICANT CHANGE UP (ref 11.5–15.5)
MAGNESIUM SERPL-MCNC: 2.1 MG/DL — SIGNIFICANT CHANGE UP (ref 1.6–2.6)
MCHC RBC-ENTMCNC: 29.4 PG — SIGNIFICANT CHANGE UP (ref 27–34)
MCHC RBC-ENTMCNC: 33.1 GM/DL — SIGNIFICANT CHANGE UP (ref 32–36)
MCV RBC AUTO: 88.8 FL — SIGNIFICANT CHANGE UP (ref 80–100)
NRBC # BLD: 0 /100 WBCS — SIGNIFICANT CHANGE UP (ref 0–0)
NRBC # FLD: 0 K/UL — SIGNIFICANT CHANGE UP (ref 0–0)
PHOSPHATE SERPL-MCNC: 3.9 MG/DL — SIGNIFICANT CHANGE UP (ref 2.5–4.5)
PLATELET # BLD AUTO: 237 K/UL — SIGNIFICANT CHANGE UP (ref 150–400)
POTASSIUM SERPL-MCNC: 3.8 MMOL/L — SIGNIFICANT CHANGE UP (ref 3.5–5.3)
POTASSIUM SERPL-SCNC: 3.8 MMOL/L — SIGNIFICANT CHANGE UP (ref 3.5–5.3)
PROT SERPL-MCNC: 6.1 G/DL — SIGNIFICANT CHANGE UP (ref 6–8.3)
RBC # BLD: 4.19 M/UL — SIGNIFICANT CHANGE UP (ref 3.8–5.2)
RBC # FLD: 13.6 % — SIGNIFICANT CHANGE UP (ref 10.3–14.5)
SODIUM SERPL-SCNC: 138 MMOL/L — SIGNIFICANT CHANGE UP (ref 135–145)
WBC # BLD: 4.27 K/UL — SIGNIFICANT CHANGE UP (ref 3.8–10.5)
WBC # FLD AUTO: 4.27 K/UL — SIGNIFICANT CHANGE UP (ref 3.8–10.5)

## 2022-11-25 PROCEDURE — 99239 HOSP IP/OBS DSCHRG MGMT >30: CPT | Mod: GC

## 2022-11-25 PROCEDURE — 99232 SBSQ HOSP IP/OBS MODERATE 35: CPT | Mod: GC

## 2022-11-25 RX ORDER — GABAPENTIN 400 MG/1
1 CAPSULE ORAL
Qty: 21 | Refills: 0
Start: 2022-11-25 | End: 2022-12-01

## 2022-11-25 RX ORDER — VALACYCLOVIR 500 MG/1
1 TABLET, FILM COATED ORAL
Qty: 21 | Refills: 0
Start: 2022-11-25 | End: 2022-12-01

## 2022-11-25 RX ADMIN — GABAPENTIN 100 MILLIGRAM(S): 400 CAPSULE ORAL at 13:38

## 2022-11-25 RX ADMIN — Medication 263 MILLIGRAM(S): at 05:30

## 2022-11-25 RX ADMIN — GABAPENTIN 100 MILLIGRAM(S): 400 CAPSULE ORAL at 05:30

## 2022-11-25 RX ADMIN — Medication 263 MILLIGRAM(S): at 13:39

## 2022-11-25 NOTE — PROGRESS NOTE ADULT - PROBLEM SELECTOR PROBLEM 1
Disseminated herpes zoster

## 2022-11-25 NOTE — DISCHARGE NOTE PROVIDER - NSDCCPTREATMENT_GEN_ALL_CORE_FT
PRINCIPAL PROCEDURE  Procedure: Incision and drainage of deep abscess of lower leg  Findings and Treatment: Skin was cleaned, 1% lidocaine was used for topical anesthesia. A small incision was made and abscess loculations were manually broken. Sterile saline was used to flush and clean out the abscess. No packing or sutures were left in place to allow for further drainage. Wound was covered with waterproof dressing. You may take showers with dressing in place. Please keep dressing in place for 4-5 days. Please follow up with your primary care provider for further care.

## 2022-11-25 NOTE — PROGRESS NOTE ADULT - ATTENDING COMMENTS
67 year old female with no significant PMH p/w blistering and painful rash consistent with disseminated herpes zoster.     s/p I&D of R posterior thigh abscess today  rash improving- per ID okay to switch to Valtrex for another 7 days on d/c  will start Bactrim x7 days for abscess to cover for staph    d/c home today    d/w HS 4
Patient seen and examined by myself , case discussed  with resident ,agree with the above finding and plan  67F with no PMH who presents to the hospital with complaints of a painful blistering rash since last week, admitted with disseminated herpes zoster. Pt c/o burning sensation and with tingling/needles and pin , denies headache, dizziness, SOB, CP, Palpitations , N/V/D, abdominal pain  c/w IV acyclovir , will change gabapentin  ATC from PRN   ID and opthalmology consult appreciated  Monitor renal functions   Rest of the management as above, plan of care d/w pt

## 2022-11-25 NOTE — DISCHARGE NOTE NURSING/CASE MANAGEMENT/SOCIAL WORK - NSDCPEFALRISK_GEN_ALL_CORE
For information on Fall & Injury Prevention, visit: [email or web address] OR  [email or web address] OR  https://www.cdc.gov/steadi/patient.html

## 2022-11-25 NOTE — PROGRESS NOTE ADULT - PROBLEM SELECTOR PLAN 3
1 episode of urinary incontinence on arrival to the ED room, patient unsure if it occurred due to her being nervous due to being in the ED or due to pain or other cause, denies any back pain, no saddle anesthesia, no incontinence since, no bowel issues, no leg numbness/weakness. No further episodes.  - Unclear on cause of incontinence, possibly stress vs urge incontinence, doubt neurological causes as patient neurologically intact and no red flag symptoms  - Would monitor for now, pt denied any further episodes

## 2022-11-25 NOTE — PROGRESS NOTE ADULT - REASON FOR ADMISSION
Disseminated Herpes Zoster

## 2022-11-25 NOTE — PROGRESS NOTE ADULT - PROBLEM SELECTOR PLAN 4
Hospital Bundle    Fluids: LR when getting acyclovir  Electrolytes: Replete K > 4, Mg > 2, Phos > 3  Nutrition: Diet Regular, encouraged hydration  PPX  ---VTE: Lovenox subq  ---GI: n/a PO diet  ---Resp: n/a 100% on RA  Access: PIV  Code Status: FULL CODE  Dispo: Home upon clinical improvement

## 2022-11-25 NOTE — PROGRESS NOTE ADULT - SUBJECTIVE AND OBJECTIVE BOX
Follow Up:  disseminated zoster    Interval History/ROS: pt feels better, no new lesions and they are crusting, no diarrhea, cough or dysuria        Allergies  No Known Allergies        ANTIMICROBIALS:  acyclovir IVPB 650 every 8 hours  trimethoprim  160 mG/sulfamethoxazole 800 mG 1 every 12 hours      OTHER MEDS:  acetaminophen     Tablet .. 650 milliGRAM(s) Oral every 6 hours PRN  bisacodyl 5 milliGRAM(s) Oral daily PRN  gabapentin 100 milliGRAM(s) Oral three times a day  lactated ringers. 1000 milliLiter(s) IV Continuous <Continuous>  melatonin 3 milliGRAM(s) Oral at bedtime PRN  ondansetron Injectable 4 milliGRAM(s) IV Push every 8 hours PRN      Vital Signs Last 24 Hrs  T(C): 36.8 (25 Nov 2022 13:35), Max: 37.1 (24 Nov 2022 21:55)  T(F): 98.3 (25 Nov 2022 13:35), Max: 98.7 (24 Nov 2022 21:55)  HR: 76 (25 Nov 2022 13:35) (60 - 76)  BP: 112/54 (25 Nov 2022 13:35) (110/61 - 116/56)  BP(mean): --  RR: 18 (25 Nov 2022 13:35) (17 - 18)  SpO2: 97% (25 Nov 2022 13:35) (97% - 99%)    Parameters below as of 25 Nov 2022 13:35  Patient On (Oxygen Delivery Method): room air        Physical Exam:  General:    NAD,  non toxic  Respiratory:   comfortable on Ra  abd:     soft,   BS +,   no tenderness  :   no CVAT,  no suprapubic tenderness,   no  bowles  Musculoskeletal:   no joint swelling  vascular: no phlebitis  Skin:   crusting vesicular rash on L chest/neck and R forearm, no open lesions, one large erythematous lesion on R thigh, no purulence                        12.3   4.27  )-----------( 237      ( 25 Nov 2022 06:32 )             37.2       11-25    138  |  104  |  11  ----------------------------<  140<H>  3.8   |  25  |  0.81    Ca    8.5      25 Nov 2022 06:32  Phos  3.9     11-25  Mg     2.10     11-25    TPro  6.1  /  Alb  3.6  /  TBili  <0.2  /  DBili  x   /  AST  12  /  ALT  12  /  AlkPhos  53  11-25          MICROBIOLOGY:  v            RADIOLOGY:  Images independently visualized and reviewed personally, findings as below  < from: Xray Chest 2 Views PA/Lat (02.10.19 @ 18:19) >  IMPRESSION:     Clear lungs.    < end of copied text >

## 2022-11-25 NOTE — DISCHARGE NOTE PROVIDER - NSFOLLOWUPCLINICS_GEN_ALL_ED_FT
[facility] - Ophthalmology  Ophthalmology  [address], [facility] [address]  [place] [address]  Phone: [phone]  Fax:   Follow Up Time: 2 weeks

## 2022-11-25 NOTE — PROGRESS NOTE ADULT - PROBLEM SELECTOR PLAN 1
Patient with blistering rash first appearing on Sunday, some areas scabbed over, some still blisters, having new areas of involvement despite treatment with PO valtrex.  - c/w acyclovir IV  - IVF while getting acyclovir to prevent CARA  - Gabapentin prn for neuropathic pain, oxy IR prn for pain   - ID consulted for disseminated zoster, c/w acyclovir --> clarify length of course given prior PO valtrex.  - Dermatology consult cancelled  - Consulted ophthalmology, recs appreciated. r/o zoster ophthalmicus; negative Ying sign, unlikely involvement  - monitor for new lesions

## 2022-11-25 NOTE — DISCHARGE NOTE PROVIDER - HOSPITAL COURSE
Discharge Summary     Admit Date: 11-23-22  Discharge Date: 11-25-22    Admission diagnoses:  Herpes zoster without complication    Hospital Course:   For full details, please see H&P, progress notes, consult notes and ancillary notes. Briefly, SETH MENDOZA is a 67y female with no prior medical history presenting with blistering and painful rash f/w disseminated herpes zoster. Patient was started on IV valacyclovir and gabapentin with good response. No new skin lesions were found and all lesions on presentation scabbed over. A skin abscess was noted on the R posterior thigh. I&D was performed on 11/25, no packing or sutures left.     On day of discharge, patient is clinically stable with no new exam findings or acute symptoms compared to prior. The patient was seen by the attending physician on the date of discharge and deemed stable and acceptable for discharge. The patient's chronic medical conditions were treated accordingly per the patient's home medication regimen. The patient's medication reconciliation (with changes made to chronic medications), follow up appointments, discharge orders, instructions, and significant lab and diagnostic studies are as noted.     Discharge follow up action items:     1. Follow up with PCP in 1-2 weeks.   2. Follow up final abscess culture results.  3. Start taking Valtrex for 1 week course. Start taking Bactrim for 1 week course.    Patient's ordered code status: FULL CODE    Patient disposition: Home no needs

## 2022-11-25 NOTE — DISCHARGE NOTE PROVIDER - CARE PROVIDER_API CALL
DARIUSZ ALARCON  Internal Medicine  [address] [place], [place] [address]  Phone: [phone]  Fax: ()-  Established Patient  Follow Up Time: 2 weeks

## 2022-11-25 NOTE — DISCHARGE NOTE NURSING/CASE MANAGEMENT/SOCIAL WORK - PATIENT PORTAL LINK FT
You can access the FollowMyHealth Patient Portal offered by [facility] by registering at the following website: [email or web address]. By joining [facility]’s FollowMyHealth portal, you will also be able to view your health information using other applications (apps) compatible with our system.

## 2022-11-25 NOTE — DISCHARGE NOTE PROVIDER - NSDCCPCAREPLAN_GEN_ALL_CORE_FT
PRINCIPAL DISCHARGE DIAGNOSIS  Diagnosis: Shingles  Assessment and Plan of Treatment: You were found with shingles, a viral infection of your skin. Lesions were seen on your face, shoulder, chest, arm, leg, and ankle. You were treated with IV fluids, IV antivirals, and gabapentin for neuropathic pain. Your skins lesions all resolved or crusted over, indicating they are no longer contagious. One of the lesions was noted with a secondary bacterial abscess and an incision and drainage was performed. Please follow up with your primary care provider for further care. Please continue taking medications as directed. Please get full vaccine series.  Contact a health care provider if:  •Your pain is not relieved with prescribed medicines.  •Your pain does not get better after the rash heals.  •You have any of these signs of infection:  •More redness, swelling, or pain around the rash.  •Fluid or blood coming from the rash.  •Warmth coming from your rash.  •Pus or a bad smell coming from the rash.  •A fever.  Get help right away if:  •The rash is on your face or nose.  •You have facial pain, pain around your eye area, or loss of feeling on one side of your face.  •You have difficulty seeing.  •You have ear pain or have ringing in your ear  •You have a loss of taste.  •Your condition gets worse.

## 2022-11-25 NOTE — PROGRESS NOTE ADULT - SUBJECTIVE AND OBJECTIVE BOX
***************************************************************  Edouard Benavides, PGY1  Internal Medicine   pager:  [facility]: [phone] [facility]: [phone]  ***************************************************************    SETH MENDOZA  67y  MRN: [id number]    Patient is a 67y old  Female who presents with a chief complaint of Disseminated Herpes Zoster (24 Nov 2022 08:17)      Interval/Overnight Events: no events ON.   - I&D today    Subjective: Pt seen and examined at bedside. Denies fever, CP, SOB, abn pain, N/V, dysuria. Tolerating diet.      MEDICATIONS  (STANDING):  acyclovir IVPB 650 milliGRAM(s) IV Intermittent every 8 hours  gabapentin 100 milliGRAM(s) Oral three times a day  lactated ringers. 1000 milliLiter(s) (100 mL/Hr) IV Continuous <Continuous>    MEDICATIONS  (PRN):  acetaminophen     Tablet .. 650 milliGRAM(s) Oral every 6 hours PRN Temp greater or equal to 38C (100.4F), Mild Pain (1 - 3)  bisacodyl 5 milliGRAM(s) Oral daily PRN Constipation  melatonin 3 milliGRAM(s) Oral at bedtime PRN Insomnia  ondansetron Injectable 4 milliGRAM(s) IV Push every 8 hours PRN Nausea and/or Vomiting      Objective:    Vitals: Vital Signs Last 24 Hrs  T(C): 36.5 (11-25-22 @ 06:04), Max: 37.1 (11-24-22 @ 21:55)  T(F): 97.7 (11-25-22 @ 06:04), Max: 98.7 (11-24-22 @ 21:55)  HR: 60 (11-25-22 @ 06:04) (60 - 65)  BP: 110/61 (11-25-22 @ 06:04) (110/61 - 122/73)  BP(mean): --  RR: 17 (11-25-22 @ 06:04) (17 - 17)  SpO2: 97% (11-25-22 @ 06:04) (97% - 100%)                I&O's Summary    24 Nov 2022 07:01  -  25 Nov 2022 07:00  --------------------------------------------------------  IN: 500 mL / OUT: 0 mL / NET: 500 mL    PHYSICAL EXAM:  GENERAL: NAD, resting comfortably in bed.  HEAD:  Atraumatic, Normocephalic  EYES: EOMI, conjunctiva and sclera clear  CHEST/LUNG: Clear to auscultation bilaterally; No rales, rhonchi, wheezing, or rubs  HEART: Regular rate and rhythm; No murmurs, rubs, or gallops  ABDOMEN: Soft, Nontender, Nondistended;   SKIN: Mild erythema L nose w/ lesion in nose, diffuse erythema noted L upper chest/shoulder, Boil/folliculitis R posterior thigh ~1x1cm, scabbed lesion on R ankle. No lesions TTP asides from R posterior thigh.  NERVOUS SYSTEM:  Alert & Oriented X3, no focal deficits    LABS:  11-24    135  |  101  |  11  ----------------------------<  143<H>  3.8   |  24  |  0.82  11-23    141  |  106  |  19  ----------------------------<  107<H>  3.7   |  25  |  0.91    Ca    9.2      24 Nov 2022 07:16  Ca    8.8      23 Nov 2022 00:44  Phos  3.4     11-24  Mg     2.20     11-24    TPro  7.0  /  Alb  4.2  /  TBili  0.4  /  DBili  x   /  AST  17  /  ALT  16  /  AlkPhos  57  11-24  TPro  6.8  /  Alb  4.1  /  TBili  0.2  /  DBili  x   /  AST  16  /  ALT  13  /  AlkPhos  59  11-23             12.3   4.27  )-----------( 237      ( 25 Nov 2022 06:32 )             37.2                         13.9   4.60  )-----------( 275      ( 24 Nov 2022 07:16 )             42.6                         12.6   5.76  )-----------( 259      ( 23 Nov 2022 00:44 )             38.4     CAPILLARY BLOOD GLUCOSE          RADIOLOGY & ADDITIONAL TESTS:    Imaging Personally Reviewed:  [x ] YES  [ ] NO    Consultants involved in case:   Consultant(s) Notes Reviewed:  [ x] YES  [ ] NO:   Care Discussed with Consultants/Other Providers [x ] YES  [ ] NO         ***************************************************************  Edouard Benavides, PGY1  Internal Medicine   pager:  [facility]: [phone] [facility]: [phone]  ***************************************************************    SETH MENDOZA  67y  MRN: [id number]    Patient is a 67y old  Female who presents with a chief complaint of Disseminated Herpes Zoster (24 Nov 2022 08:17)      Interval/Overnight Events: no events ON.   - I&D today    Subjective: Pt seen and examined at bedside. Denies fever, CP, SOB, abn pain, N/V, dysuria. Tolerating diet.      MEDICATIONS  (STANDING):  acyclovir IVPB 650 milliGRAM(s) IV Intermittent every 8 hours  gabapentin 100 milliGRAM(s) Oral three times a day  lactated ringers. 1000 milliLiter(s) (100 mL/Hr) IV Continuous <Continuous>    MEDICATIONS  (PRN):  acetaminophen     Tablet .. 650 milliGRAM(s) Oral every 6 hours PRN Temp greater or equal to 38C (100.4F), Mild Pain (1 - 3)  bisacodyl 5 milliGRAM(s) Oral daily PRN Constipation  melatonin 3 milliGRAM(s) Oral at bedtime PRN Insomnia  ondansetron Injectable 4 milliGRAM(s) IV Push every 8 hours PRN Nausea and/or Vomiting      Objective:    Vitals: Vital Signs Last 24 Hrs  T(C): 36.5 (11-25-22 @ 06:04), Max: 37.1 (11-24-22 @ 21:55)  T(F): 97.7 (11-25-22 @ 06:04), Max: 98.7 (11-24-22 @ 21:55)  HR: 60 (11-25-22 @ 06:04) (60 - 65)  BP: 110/61 (11-25-22 @ 06:04) (110/61 - 122/73)  BP(mean): --  RR: 17 (11-25-22 @ 06:04) (17 - 17)  SpO2: 97% (11-25-22 @ 06:04) (97% - 100%)                I&O's Summary    24 Nov 2022 07:01  -  25 Nov 2022 07:00  --------------------------------------------------------  IN: 500 mL / OUT: 0 mL / NET: 500 mL    PHYSICAL EXAM:  GENERAL: NAD, resting comfortably in bed.  HEAD:  Atraumatic, Normocephalic  EYES: EOMI, conjunctiva and sclera clear  CHEST/LUNG: Clear to auscultation bilaterally; No rales, rhonchi, wheezing, or rubs  HEART: Regular rate and rhythm; No murmurs, rubs, or gallops  ABDOMEN: Soft, Nontender, Nondistended;   SKIN: Facial lesion, L upper chest/shoulder lesions, R arm lesions, and R ankle lesions appears improved. R posterior thigh boil more superfial now,   NERVOUS SYSTEM:  Alert & Oriented X3, no focal deficits    LABS:  11-24    135  |  101  |  11  ----------------------------<  143<H>  3.8   |  24  |  0.82  11-23    141  |  106  |  19  ----------------------------<  107<H>  3.7   |  25  |  0.91    Ca    9.2      24 Nov 2022 07:16  Ca    8.8      23 Nov 2022 00:44  Phos  3.4     11-24  Mg     2.20     11-24    TPro  7.0  /  Alb  4.2  /  TBili  0.4  /  DBili  x   /  AST  17  /  ALT  16  /  AlkPhos  57  11-24  TPro  6.8  /  Alb  4.1  /  TBili  0.2  /  DBili  x   /  AST  16  /  ALT  13  /  AlkPhos  59  11-23             12.3   4.27  )-----------( 237      ( 25 Nov 2022 06:32 )             37.2                         13.9   4.60  )-----------( 275      ( 24 Nov 2022 07:16 )             42.6                         12.6   5.76  )-----------( 259      ( 23 Nov 2022 00:44 )             38.4     CAPILLARY BLOOD GLUCOSE          RADIOLOGY & ADDITIONAL TESTS:    Imaging Personally Reviewed:  [x ] YES  [ ] NO    Consultants involved in case:   Consultant(s) Notes Reviewed:  [ x] YES  [ ] NO:   Care Discussed with Consultants/Other Providers [x ] YES  [ ] NO

## 2022-11-25 NOTE — PROGRESS NOTE ADULT - ASSESSMENT
67 f with no significant PMH, initially developed a vesiuclar rash on L chest shoulder and was given valtrex which she took for 7 days but started to have new lesions on R forearm, R thigh and now some erythema on L face/nose and pain on the L face, no eye pain or change in the vision   afebrile, WBC normal, CR: 0.9  HIV negative    disseminated zoster, started with a vesicular rash on L chest/shoulder but developed more on R forearm, R thigh and now some erythema on L face/nose and pain on the L face, no eye pain or change in the vision, pt is not immunocompromised  ?R thigh abscess on the zoster lesion, s/p I&D by primary team  * c/w acyclovir to 650mg IV q8H while in the hospital, started 11/23 now day 3  * switch to valtrex on discharge to complete a 7 day course  * monitor Cr  * f/u the ?abscess cx  The above assessment and plan was discussed with the primary team    Rama Diaz MD  contact on teams  After 5pm and on weekends call [phone]

## 2022-11-30 LAB
CULTURE RESULTS: NO GROWTH — SIGNIFICANT CHANGE UP
SPECIMEN SOURCE: SIGNIFICANT CHANGE UP

## 2023-06-05 NOTE — ED PROVIDER NOTE - NS ED MD DISPO ADMIT [facility] UNIT
No care due was identified.  [facility] Embedded Care Due Messages. Reference number: [id number].   6/05/2023 11:49:29 AM CDT   SURG

## 2024-02-27 NOTE — ED PROVIDER NOTE - ENMT NEGATIVE STATEMENT, MLM
Spoke to  at [id number]  Reviewed recommendation to wait 2 weeks or mor following cataract surgery before gallstone/gallbladder surgery.     verbally demonstrated understanding.    Wilfred Blackwell RN 11:07 AM 02/27/24     Ears: no ear pain and no hearing problems.Nose: no nasal congestion and no nasal drainage.Mouth/Throat: no dysphagia, no hoarseness and no throat pain.Neck: no lumps, no pain, no stiffness and no swollen glands.

## 2025-03-11 NOTE — ED ADULT TRIAGE NOTE - SPO2 (%)
[] : Fellow [FreeTextEntry3] : Patient seen and examined. Case discussed with preventive cardiology fellow. Agree with plan as detailed above.  100
